# Patient Record
Sex: FEMALE | Race: WHITE | NOT HISPANIC OR LATINO | Employment: PART TIME | URBAN - METROPOLITAN AREA
[De-identification: names, ages, dates, MRNs, and addresses within clinical notes are randomized per-mention and may not be internally consistent; named-entity substitution may affect disease eponyms.]

---

## 2017-05-30 ENCOUNTER — OFFICE VISIT (OUTPATIENT)
Dept: NEUROLOGY | Facility: CLINIC | Age: 22
End: 2017-05-30
Payer: COMMERCIAL

## 2017-05-30 VITALS
HEART RATE: 72 BPM | BODY MASS INDEX: 35.43 KG/M2 | WEIGHT: 239.19 LBS | HEIGHT: 69 IN | DIASTOLIC BLOOD PRESSURE: 76 MMHG | SYSTOLIC BLOOD PRESSURE: 110 MMHG

## 2017-05-30 DIAGNOSIS — G47.33 OBSTRUCTIVE SLEEP APNEA: Primary | ICD-10-CM

## 2017-05-30 PROCEDURE — 99999 PR PBB SHADOW E&M-EST. PATIENT-LVL III: CPT | Mod: PBBFAC,,, | Performed by: NURSE PRACTITIONER

## 2017-05-30 PROCEDURE — 99213 OFFICE O/P EST LOW 20 MIN: CPT | Mod: S$GLB,,, | Performed by: NURSE PRACTITIONER

## 2017-05-30 NOTE — PROGRESS NOTES
Sho Castaneda a 22 y.o. female  returns today regarding the management of obstructive sleep apnea. Last seen 2015.     Since last seen, she has continued to use apap nightly. Faithfully. Continued resolution of snoring, disrupted sleep. Feels well rested in am. No pressure intolerance or leak issues. No chinstrap but denies oral drying. Still feels tired but attributes to thyroid disease. No recent labs (july '16 4.03). Using apap has helped nasal patency, less congested. 9# gain.Denies feeling sleepy during daytime or falling asleep inappropriately. Denies interval medical change.     Interrogation- 90% tile 8.7cm. avg use 8:12h/n. Nuance mask. AHI 1.7, 0% leak. 0% periodic. 30d>4h.       HISTORY:  1/15/15:  She has since been setup with apap 4-14cm. She has been using it nightly. No more snoring she and her boyfriend are happy about. She finds she is getting deeper sleep. Sleep is also less disrupted from 0-1x/night now vs previous 3-4x/night. She likes her nasal mask. Using chin strap. No nasal drying. No oral drying. No mask air leaks. Still having daytime tiredness, sleep bit unrefreshing. Finds the pressure has helped her sinus patency and asthma. Not needed to use rescue inhaler in past 2 -mos.  She takes Vyvanse long-acting am and short-acting at 4pm. Denies difficulty falling asleep. ESS=4    When she can nap, denies vivid dreams. Naps are not refreshing though. Denies sleep paralysis recurrent episodes, once age 14. Denies hallucinations, restless legs or leg kicking.     Interrogation: new machine condition. 30d avg 9:12h/night. M Nuance mask fits well today upon demo. AHI 0.9. 90%-tile 8-9cm. 0% air leak. 30/30d>4h  FH: dad +RODRIGUE/PAP    REVIEW OF SYSTEMS: Sleep related symptoms as per HPI. + Focus problems. + sinus congestion improved, no longer using NS. Otherwise a balance review of 10-systems is negative.     HST AHI 5/low sat 83%. (Nov 2014, 246#)         PHYSICAL EXAM:   /76   Pulse 72   " Ht 5' 9" (1.753 m)   Wt 108.5 kg (239 lb 3.2 oz)   BMI 35.32 kg/m²   GENERAL: Obese body habitus, well groomed     ASSESSMENT:   1. Obstructive sleep apnea, mild significant, continued excellent adherence, with improvement of symptoms  Medical comorbidities include: obesity    PLAN:   1. Continue APAP 7-10cm. Discussed effectiveness of her therapy and potential ramifications of untreated RODRIGUE, including heart disease, hypertension, cognitive difficulties, stroke, and diabetes.   [facility] DME prn replacement mask/supplies.   2. Advised to abstain from driving should she feel sleepy or drowsy.   3. See PCP re: TSH  4. RTC 1 yr, sooner if needed  "

## 2018-01-03 ENCOUNTER — PATIENT MESSAGE (OUTPATIENT)
Dept: ENDOCRINOLOGY | Facility: CLINIC | Age: 23
End: 2018-01-03

## 2018-01-03 ENCOUNTER — OFFICE VISIT (OUTPATIENT)
Dept: ENDOCRINOLOGY | Facility: CLINIC | Age: 23
End: 2018-01-03
Payer: COMMERCIAL

## 2018-01-03 VITALS
HEART RATE: 78 BPM | DIASTOLIC BLOOD PRESSURE: 70 MMHG | SYSTOLIC BLOOD PRESSURE: 116 MMHG | BODY MASS INDEX: 37.65 KG/M2 | WEIGHT: 254.19 LBS | HEIGHT: 69 IN

## 2018-01-03 DIAGNOSIS — E03.9 HYPOTHYROIDISM, UNSPECIFIED TYPE: Primary | ICD-10-CM

## 2018-01-03 DIAGNOSIS — F32.A DEPRESSION, UNSPECIFIED DEPRESSION TYPE: ICD-10-CM

## 2018-01-03 DIAGNOSIS — E06.3 HASHIMOTO'S DISEASE: ICD-10-CM

## 2018-01-03 PROCEDURE — 99999 PR PBB SHADOW E&M-EST. PATIENT-LVL III: CPT | Mod: PBBFAC,,, | Performed by: INTERNAL MEDICINE

## 2018-01-03 PROCEDURE — 99204 OFFICE O/P NEW MOD 45 MIN: CPT | Mod: S$GLB,,, | Performed by: INTERNAL MEDICINE

## 2018-01-03 RX ORDER — LIOTHYRONINE SODIUM 5 UG/1
5 TABLET ORAL DAILY
Qty: 30 TABLET | Refills: 5 | Status: SHIPPED | OUTPATIENT
Start: 2018-01-03 | End: 2023-04-20

## 2018-01-03 RX ORDER — LEVOTHYROXINE SODIUM 25 UG/1
25 TABLET ORAL DAILY
Qty: 30 TABLET | Refills: 5 | Status: SHIPPED | OUTPATIENT
Start: 2018-01-03 | End: 2018-08-14 | Stop reason: SDUPTHER

## 2018-01-03 NOTE — PROGRESS NOTES
Subjective:      Patient ID: Sho Castaneda is a 22 y.o. female.    Chief Complaint:  Hypothyroidism      History of Present Illness  Ms. Castaneda presents for evaluation and management of hypothyroidism.    Has active history of hypothyroidism secondary to Hashimoto's, RODRIGUE and ADD.    First diagnosed with hypothyroidism in 2011. Has been on levothyroxine 50 mcg since that time.    Ran out of levothyroxine 4 months ago.     Has overt fatigue but stable and ADHD medication improves fatigue. Still has significant mental fog. Has some cold intolerance. BM's normal.   Has depression that is reasonably controlled on Prozac.    Weight has been stable in the past 3-4 years.     Review of Systems   Constitutional: Negative for unexpected weight change.   HENT: Negative for voice change.    Eyes: Negative for visual disturbance.   Respiratory: Negative for shortness of breath.    Cardiovascular: Negative for chest pain.   Gastrointestinal: Negative for abdominal pain.   Endocrine: Negative for cold intolerance.   Genitourinary: Negative for frequency.   Musculoskeletal: Negative for myalgias.   Skin: Negative for rash.       Objective:   Physical Exam   Constitutional: She is oriented to person, place, and time. She appears well-developed and well-nourished.   HENT:   Head: Normocephalic and atraumatic.   Right Ear: External ear normal.   Left Ear: External ear normal.   Nose: Nose normal.   Neck: No tracheal deviation present. No thyromegaly present.   Cardiovascular: Normal rate, regular rhythm and normal heart sounds.    No edema   Pulmonary/Chest: Effort normal and breath sounds normal.   Abdominal: Soft. Bowel sounds are normal. There is no tenderness.   Musculoskeletal:   Normal gait, no cyanosis or clubbing   Neurological: She is alert and oriented to person, place, and time. She has normal reflexes.   Vibration sense intact   Skin: Skin is warm and dry. No rash noted.   No nodules, no ulcers   Psychiatric: She has  a normal mood and affect. Judgment normal.   Vitals reviewed.  Thin pearly striae on abdomen    Lab Review:   Results for YVONNE FLORES (MRN [id number]) as of 1/3/2018 07:42   Ref. Range 7/6/2016 09:20   TSH Latest Ref Range: 0.400 - 4.000 uIU/mL 4.038 (H)   T3, Free Latest Ref Range: 2.3 - 4.2 pg/mL 2.2 (L)   Free T4 Latest Ref Range: 0.71 - 1.51 ng/dL 0.85   Thyroglobulin Interpretation Unknown SEE BELOW   Thyroglobulin Antibody Screen Latest Ref Range: <4.0 IU/mL <1.8   Thyroglobulin, Tumor Marker Latest Units: ng/mL 10 (H)   Thyroperoxidase Antibodies Latest Ref Range: <6.0 IU/mL 105.5 (H)       Assessment:     1. Hypothyroidism, unspecified type    2. Hashimoto's disease    3. Depression, unspecified depression type      Plan:     --Patient with hypothyroidism secondary to Hashimoto's  --She is having some symptoms that could be thyroid related, but she reports that the symptoms are unchanged whether or not she is on or off of thyroid hormone  --Will get a set of TFT's now with her off of thyroid hormone for 4 months  --Discussed treatment options  --She had questions about possibly using [place] (discussed cons of [place], but that there is some evidence for therapy with T3 in patients with depression and hypothyroidism)  --Will consider low dose Cytomel with levothyroxine once labs are back  --Will then need to repeat TSH in 6 weeks after dose adjusted  --Reviewed most recent thyroid ultrasound and no distinct thyroid nodules are seen    Manuel Travis M.D. Staff Endocrinology    Manuel Travis M.D. Staff Endocrinology

## 2018-02-15 ENCOUNTER — LAB VISIT (OUTPATIENT)
Dept: LAB | Facility: HOSPITAL | Age: 23
End: 2018-02-15
Attending: INTERNAL MEDICINE
Payer: COMMERCIAL

## 2018-02-15 DIAGNOSIS — E03.9 HYPOTHYROIDISM, UNSPECIFIED TYPE: ICD-10-CM

## 2018-02-15 LAB — TSH SERPL DL<=0.005 MIU/L-ACNC: 1.7 UIU/ML

## 2018-02-15 PROCEDURE — 84443 ASSAY THYROID STIM HORMONE: CPT

## 2018-02-15 PROCEDURE — 36415 COLL VENOUS BLD VENIPUNCTURE: CPT

## 2018-02-18 ENCOUNTER — PATIENT MESSAGE (OUTPATIENT)
Dept: ENDOCRINOLOGY | Facility: CLINIC | Age: 23
End: 2018-02-18

## 2018-02-19 ENCOUNTER — PATIENT MESSAGE (OUTPATIENT)
Dept: ENDOCRINOLOGY | Facility: CLINIC | Age: 23
End: 2018-02-19

## 2018-06-24 ENCOUNTER — OFFICE VISIT (OUTPATIENT)
Dept: URGENT CARE | Facility: CLINIC | Age: 23
End: 2018-06-24
Payer: COMMERCIAL

## 2018-06-24 VITALS
DIASTOLIC BLOOD PRESSURE: 85 MMHG | HEART RATE: 115 BPM | WEIGHT: 254 LBS | BODY MASS INDEX: 37.62 KG/M2 | HEIGHT: 69 IN | OXYGEN SATURATION: 96 % | RESPIRATION RATE: 16 BRPM | TEMPERATURE: 99 F | SYSTOLIC BLOOD PRESSURE: 124 MMHG

## 2018-06-24 DIAGNOSIS — H60.93 OTITIS EXTERNA OF BOTH EARS, UNSPECIFIED CHRONICITY, UNSPECIFIED TYPE: ICD-10-CM

## 2018-06-24 DIAGNOSIS — J01.00 ACUTE NON-RECURRENT MAXILLARY SINUSITIS: Primary | ICD-10-CM

## 2018-06-24 PROCEDURE — 96372 THER/PROPH/DIAG INJ SC/IM: CPT | Mod: S$GLB,,, | Performed by: NURSE PRACTITIONER

## 2018-06-24 PROCEDURE — 99214 OFFICE O/P EST MOD 30 MIN: CPT | Mod: 25,S$GLB,, | Performed by: NURSE PRACTITIONER

## 2018-06-24 RX ORDER — AMOXICILLIN AND CLAVULANATE POTASSIUM 875; 125 MG/1; MG/1
1 TABLET, FILM COATED ORAL 2 TIMES DAILY
Qty: 20 TABLET | Refills: 0 | Status: SHIPPED | OUTPATIENT
Start: 2018-06-24 | End: 2018-07-04

## 2018-06-24 RX ORDER — DEXTROAMPHETAMINE SACCHARATE, AMPHETAMINE ASPARTATE, DEXTROAMPHETAMINE SULFATE AND AMPHETAMINE SULFATE 7.5; 7.5; 7.5; 7.5 MG/1; MG/1; MG/1; MG/1
TABLET ORAL
Refills: 0 | COMMUNITY
Start: 2018-05-27

## 2018-06-24 RX ORDER — BETAMETHASONE SODIUM PHOSPHATE AND BETAMETHASONE ACETATE 3; 3 MG/ML; MG/ML
9 INJECTION, SUSPENSION INTRA-ARTICULAR; INTRALESIONAL; INTRAMUSCULAR; SOFT TISSUE
Status: COMPLETED | OUTPATIENT
Start: 2018-06-24 | End: 2018-06-24

## 2018-06-24 RX ORDER — NEOMYCIN SULFATE, POLYMYXIN B SULFATE AND HYDROCORTISONE 10; 3.5; 1 MG/ML; MG/ML; [USP'U]/ML
3 SUSPENSION/ DROPS AURICULAR (OTIC) 4 TIMES DAILY
Qty: 10 ML | Refills: 0 | Status: SHIPPED | OUTPATIENT
Start: 2018-06-24 | End: 2018-07-01

## 2018-06-24 RX ADMIN — BETAMETHASONE SODIUM PHOSPHATE AND BETAMETHASONE ACETATE 9 MG: 3; 3 INJECTION, SUSPENSION INTRA-ARTICULAR; INTRALESIONAL; INTRAMUSCULAR; SOFT TISSUE at 01:06

## 2018-06-24 NOTE — PROGRESS NOTES
"Subjective:       Patient ID: Sho Castaneda is a 23 y.o. female.    Vitals:    06/24/18 1259   BP: 124/85   Pulse: (!) 115   Resp: 16   Temp: 99 °F (37.2 °C)   TempSrc: Oral   SpO2: 96%   Weight: 115.2 kg (254 lb)   Height: 5' 9" (1.753 m)       Chief Complaint: Sore Throat; Sinus Problem; and Cough    Patient reports 4 days of nasal congestion,productive cough,ear pain/pressure with sore throat.  Reports chills and sweating at home.   Denies hx of frequent sinus infections or allergies.         Sore Throat    This is a new problem. Episode onset: 4 days. The problem has been gradually worsening. Neither side of throat is experiencing more pain than the other. There has been no fever. The pain is at a severity of 6/10. Associated symptoms include congestion (nasal), coughing, ear pain (both ears), headaches (sinus), swollen glands and trouble swallowing. Pertinent negatives include no abdominal pain, hoarse voice or shortness of breath. Associated symptoms comments: Ear pressure. She has had no exposure to strep or mono. Treatments tried: Day Quil, Flonase. The treatment provided mild relief.   Sinus Problem   This is a new problem. Episode onset: 4 days. The problem has been gradually worsening since onset. There has been no fever. The fever has been present for less than 1 day. Her pain is at a severity of 8/10. The pain is moderate. Associated symptoms include chills, congestion (nasal), coughing, diaphoresis, ear pain (both ears), headaches (sinus), sinus pressure, sneezing, a sore throat and swollen glands. Pertinent negatives include no hoarse voice or shortness of breath. Treatments tried: Day Quil,Flonase. The treatment provided mild relief.   Cough   This is a new problem. Episode onset: 4 days. The problem has been gradually worsening. The problem occurs hourly. The cough is productive of sputum. Associated symptoms include chills, ear pain (both ears), headaches (sinus), nasal congestion, postnasal " drip, rhinorrhea (morning) and a sore throat. Pertinent negatives include no chest pain, eye redness, fever, myalgias, shortness of breath or wheezing. Associated symptoms comments: Ear pressure. Nothing aggravates the symptoms. Treatments tried: Day Quil, Flonase. The treatment provided mild relief. Her past medical history is significant for bronchitis. There is no history of asthma.     Review of Systems   Constitution: Positive for chills, diaphoresis and malaise/fatigue. Negative for fever.   HENT: Positive for congestion (nasal), ear pain (both ears), postnasal drip, rhinorrhea (morning), sinus pressure, sneezing, sore throat and trouble swallowing. Negative for hoarse voice.         Jaw pain   Eyes: Negative for discharge and redness.   Cardiovascular: Negative for chest pain, dyspnea on exertion and leg swelling.   Respiratory: Positive for cough and sputum production (gray/green). Negative for shortness of breath and wheezing.    Musculoskeletal: Negative for myalgias.   Gastrointestinal: Negative for abdominal pain and nausea.   Neurological: Positive for headaches (sinus).       Objective:      Physical Exam   Constitutional: She is oriented to person, place, and time. She appears well-developed and well-nourished. She is cooperative.  Non-toxic appearance. She appears ill. No distress.   Moist to touch    HENT:   Head: Normocephalic and atraumatic.   Right Ear: Hearing and tympanic membrane normal. There is swelling (ear canal ). No drainage. Tympanic membrane is not perforated, not erythematous and not retracted.   Left Ear: Hearing and tympanic membrane normal. There is swelling (ear canal ). No drainage. Tympanic membrane is not perforated, not erythematous and not retracted.   Nose: Mucosal edema, rhinorrhea (mucupurulent ) and sinus tenderness (marked tenderness ) present. No nasal deformity. No epistaxis. Right sinus exhibits maxillary sinus tenderness. Right sinus exhibits no frontal sinus  "tenderness. Left sinus exhibits maxillary sinus tenderness. Left sinus exhibits no frontal sinus tenderness.   Mouth/Throat: Uvula is midline and mucous membranes are normal. No trismus in the jaw. Normal dentition. No uvula swelling. Posterior oropharyngeal edema and posterior oropharyngeal erythema present. No oropharyngeal exudate or tonsillar abscesses. Tonsils are 0 on the right. Tonsils are 0 on the left.   Reports hx of "eczema to her ears"   Bilateral auricles are dry, reddened, and flaky.    Inside the bilateral canals are erythema and swelling. Able to visualize TMs. Bilaterally. No need for wick at this time.      Eyes: Conjunctivae, EOM and lids are normal. Pupils are equal, round, and reactive to light. No scleral icterus.   Sclera clear bilat   Neck: Trachea normal, normal range of motion, full passive range of motion without pain and phonation normal. Neck supple.   Cardiovascular: Regular rhythm, normal heart sounds, intact distal pulses and normal pulses.  Tachycardia present.    Pulmonary/Chest: Effort normal and breath sounds normal. No stridor. No respiratory distress.   Abdominal: Soft. Normal appearance and bowel sounds are normal. She exhibits no distension. There is no tenderness.   Musculoskeletal: Normal range of motion. She exhibits no edema or deformity.   Lymphadenopathy:        Head (right side): Submandibular and tonsillar adenopathy present.        Head (left side): Submandibular and tonsillar adenopathy present.     She has no cervical adenopathy.   Neurological: She is alert and oriented to person, place, and time. She exhibits normal muscle tone. Coordination normal.   Skin: Skin is warm, dry and intact. She is not diaphoretic. No pallor.   Psychiatric: She has a normal mood and affect. Her speech is normal and behavior is normal. Judgment and thought content normal. Cognition and memory are normal.   Nursing note and vitals reviewed.      Assessment:       1. Acute non-recurrent " maxillary sinusitis    2. Otitis externa of both ears, unspecified chronicity, unspecified type        Plan:       Sho was seen today for sore throat, sinus problem and cough.    Diagnoses and all orders for this visit:    Acute non-recurrent maxillary sinusitis  -     amoxicillin-clavulanate 875-125mg (AUGMENTIN) 875-125 mg per tablet; Take 1 tablet by mouth 2 (two) times daily. for 10 days  -     betamethasone acetate-betamethasone sodium phosphate injection 9 mg; Inject 1.5 mLs (9 mg total) into the muscle one time.    Otitis externa of both ears, unspecified chronicity, unspecified type  -     neomycin-polymyxin-hydrocortisone (CORTISPORIN) 3.5-10,000-1 mg/mL-unit/mL-% otic suspension; Place 3 drops into both ears 4 (four) times daily. for 7 days        will start PO antibiotic treatment based on physical exam  Discussed OTC agents to help with symptom relief.     Patient Instructions     Please drink plenty of fluids.  Please get plenty of rest.  Please return here or go to the Emergency Department for any concerns or worsening of condition.  If you were given wait & see antibiotics, please wait 3-5 days before taking them, and only take them if your symptoms have worsened or not improved.  If you do begin taking the antibiotics, please take them to completion.  If you were prescribed antibiotics, please take them to completion.  If you were prescribed a narcotic medication, do not drive or operate heavy equipment or machinery while taking these medications.  If you do not have Hypertension or any history of palpitations, it is ok to take over the counter Sudafed or Mucinex D or Allegra-D or Claritin-D or Zyrtec-D.  If you do take one of the above, it is ok to combine that with plain over the counter Mucinex or Allegra or Claritin or Zyrtec.  If for example you are taking Zyrtec -D, you can combine that with Mucinex, but not Mucinex-D.  If you are taking Mucinex-D, you can combine that with plain Allegra or  Claritin or Zyrtec.   If you do have Hypertension or palpitations, it is safe to take Coricidin HBP for relief of sinus symptoms.  We recommend you take over the counter Flonase (Fluticasone) or another nasally inhaled steroid unless you are already taking one.  Nasal irrigation with a saline spray or Netti Pot like device per their directions is also recommended.  If not allergic, please take over the counter Tylenol (Acetaminophen) and/or Motrin (Ibuprofen) as directed for control of pain and/or fever.  Please follow up with your primary care doctor or specialist as needed.    If you  smoke, please stop smoking.      Sinusitis (Antibiotic Treatment)    The sinuses are air-filled spaces within the bones of the face. They connect to the inside of the nose. Sinusitis is an inflammation of the tissue lining the sinus cavity. Sinus inflammation can occur during a cold. It can also be due to allergies to pollens and other particles in the air. Sinusitis can cause symptoms of sinus congestion and fullness. A sinus infection causes fever, headache and facial pain. There is often green or yellow drainage from the nose or into the back of the throat (post-nasal drip). You have been given antibiotics to treat this condition.  Home care:  · Take the full course of antibiotics as instructed. Do not stop taking them, even if you feel better.  · Drink plenty of water, hot tea, and other liquids. This may help thin mucus. It also may promote sinus drainage.  · Heat may help soothe painful areas of the face. Use a towel soaked in hot water. Or,  the shower and direct the hot spray onto your face. Using a vaporizer along with a menthol rub at night may also help.   · An expectorant containing guaifenesin may help thin the mucus and promote drainage from the sinuses.  · Over-the-counter decongestants may be used unless a similar medicine was prescribed. Nasal sprays work the fastest. Use one that contains phenylephrine or  oxymetazoline. First blow the nose gently. Then use the spray. Do not use these medicines more often than directed on the label or symptoms may get worse. You may also use tablets containing pseudoephedrine. Avoid products that combine ingredients, because side effects may be increased. Read labels. You can also ask the pharmacist for help. (NOTE: Persons with high blood pressure should not use decongestants. They can raise blood pressure.)  · Over-the-counter antihistamines may help if allergies contributed to your sinusitis.    · Do not use nasal rinses or irrigation during an acute sinus infection, unless told to by your health care provider. Rinsing may spread the infection to other sinuses.  · Use acetaminophen or ibuprofen to control pain, unless another pain medicine was prescribed. (If you have chronic liver or kidney disease or ever had a stomach ulcer, talk with your doctor before using these medicines. Aspirin should never be used in anyone under 18 years of age who is ill with a fever. It may cause severe liver damage.)  · Don't smoke. This can worsen symptoms.  Follow-up care  Follow up with your healthcare provider or our staff if you are not improving within the next week.  When to seek medical advice  Call your healthcare provider if any of these occur:  · Facial pain or headache becoming more severe  · Stiff neck  · Unusual drowsiness or confusion  · Swelling of the forehead or eyelids  · Vision problems, including blurred or double vision  · Fever of 100.4ºF (38ºC) or higher, or as directed by your healthcare provider  · Seizure  · Breathing problems  · Symptoms not resolving within 10 days  Date Last Reviewed: 4/13/2015  © 3536-6682 [employer]. [address] [place] [address], [place] [address]. All rights reserved. This information is not intended as a substitute for professional medical care. Always follow your healthcare professional's instructions.

## 2018-06-24 NOTE — PATIENT INSTRUCTIONS
Please drink plenty of fluids.  Please get plenty of rest.  Please return here or go to the Emergency Department for any concerns or worsening of condition.  If you were given wait & see antibiotics, please wait 3-5 days before taking them, and only take them if your symptoms have worsened or not improved.  If you do begin taking the antibiotics, please take them to completion.  If you were prescribed antibiotics, please take them to completion.  If you were prescribed a narcotic medication, do not drive or operate heavy equipment or machinery while taking these medications.  If you do not have Hypertension or any history of palpitations, it is ok to take over the counter Sudafed or Mucinex D or Allegra-D or Claritin-D or Zyrtec-D.  If you do take one of the above, it is ok to combine that with plain over the counter Mucinex or Allegra or Claritin or Zyrtec.  If for example you are taking Zyrtec -D, you can combine that with Mucinex, but not Mucinex-D.  If you are taking Mucinex-D, you can combine that with plain Allegra or Claritin or Zyrtec.   If you do have Hypertension or palpitations, it is safe to take Coricidin HBP for relief of sinus symptoms.  We recommend you take over the counter Flonase (Fluticasone) or another nasally inhaled steroid unless you are already taking one.  Nasal irrigation with a saline spray or Netti Pot like device per their directions is also recommended.  If not allergic, please take over the counter Tylenol (Acetaminophen) and/or Motrin (Ibuprofen) as directed for control of pain and/or fever.  Please follow up with your primary care doctor or specialist as needed.    If you  smoke, please stop smoking.      Sinusitis (Antibiotic Treatment)    The sinuses are air-filled spaces within the bones of the face. They connect to the inside of the nose. Sinusitis is an inflammation of the tissue lining the sinus cavity. Sinus inflammation can occur during a cold. It can also be due to  allergies to pollens and other particles in the air. Sinusitis can cause symptoms of sinus congestion and fullness. A sinus infection causes fever, headache and facial pain. There is often green or yellow drainage from the nose or into the back of the throat (post-nasal drip). You have been given antibiotics to treat this condition.  Home care:  · Take the full course of antibiotics as instructed. Do not stop taking them, even if you feel better.  · Drink plenty of water, hot tea, and other liquids. This may help thin mucus. It also may promote sinus drainage.  · Heat may help soothe painful areas of the face. Use a towel soaked in hot water. Or,  the shower and direct the hot spray onto your face. Using a vaporizer along with a menthol rub at night may also help.   · An expectorant containing guaifenesin may help thin the mucus and promote drainage from the sinuses.  · Over-the-counter decongestants may be used unless a similar medicine was prescribed. Nasal sprays work the fastest. Use one that contains phenylephrine or oxymetazoline. First blow the nose gently. Then use the spray. Do not use these medicines more often than directed on the label or symptoms may get worse. You may also use tablets containing pseudoephedrine. Avoid products that combine ingredients, because side effects may be increased. Read labels. You can also ask the pharmacist for help. (NOTE: Persons with high blood pressure should not use decongestants. They can raise blood pressure.)  · Over-the-counter antihistamines may help if allergies contributed to your sinusitis.    · Do not use nasal rinses or irrigation during an acute sinus infection, unless told to by your health care provider. Rinsing may spread the infection to other sinuses.  · Use acetaminophen or ibuprofen to control pain, unless another pain medicine was prescribed. (If you have chronic liver or kidney disease or ever had a stomach ulcer, talk with your doctor before  using these medicines. Aspirin should never be used in anyone under 18 years of age who is ill with a fever. It may cause severe liver damage.)  · Don't smoke. This can worsen symptoms.  Follow-up care  Follow up with your healthcare provider or our staff if you are not improving within the next week.  When to seek medical advice  Call your healthcare provider if any of these occur:  · Facial pain or headache becoming more severe  · Stiff neck  · Unusual drowsiness or confusion  · Swelling of the forehead or eyelids  · Vision problems, including blurred or double vision  · Fever of 100.4ºF (38ºC) or higher, or as directed by your healthcare provider  · Seizure  · Breathing problems  · Symptoms not resolving within 10 days  Date Last Reviewed: 4/13/2015  © 1134-2716 [employer]. [address] [place] [address], [place] [address]. All rights reserved. This information is not intended as a substitute for professional medical care. Always follow your healthcare professional's instructions.

## 2018-06-24 NOTE — LETTER
June 24, 2018      [facility] [address] [facility] [address]  Phone: [phone]  Fax: [phone]       Patient: Sho Castaneda   YOB: 1995  Date of Visit: 06/24/2018    To Whom It May Concern:    Alton Castaneda  was at [facility] on 06/24/2018. She may return to work on 6/26/18 with no restrictions. If you have any questions or concerns, or if I can be of further assistance, please do not hesitate to contact me.    Sincerely,        Lucille Barriga, NP

## 2018-08-14 DIAGNOSIS — E03.9 PRIMARY HYPOTHYROIDISM: Primary | ICD-10-CM

## 2018-08-14 RX ORDER — LEVOTHYROXINE SODIUM 25 UG/1
25 TABLET ORAL DAILY
Qty: 30 TABLET | Refills: 5 | Status: SHIPPED | OUTPATIENT
Start: 2018-08-14 | End: 2023-04-20

## 2018-08-27 ENCOUNTER — OFFICE VISIT (OUTPATIENT)
Dept: URGENT CARE | Facility: CLINIC | Age: 23
End: 2018-08-27
Payer: COMMERCIAL

## 2018-08-27 VITALS
WEIGHT: 240 LBS | SYSTOLIC BLOOD PRESSURE: 150 MMHG | HEART RATE: 104 BPM | DIASTOLIC BLOOD PRESSURE: 95 MMHG | TEMPERATURE: 98 F | RESPIRATION RATE: 18 BRPM | OXYGEN SATURATION: 98 % | HEIGHT: 69 IN | BODY MASS INDEX: 35.55 KG/M2

## 2018-08-27 DIAGNOSIS — H60.543 ACUTE ECZEMATOID OTITIS EXTERNA OF BOTH EARS: ICD-10-CM

## 2018-08-27 DIAGNOSIS — J02.9 PHARYNGITIS, UNSPECIFIED ETIOLOGY: Primary | ICD-10-CM

## 2018-08-27 LAB
CTP QC/QA: YES
S PYO RRNA THROAT QL PROBE: NEGATIVE

## 2018-08-27 PROCEDURE — 99214 OFFICE O/P EST MOD 30 MIN: CPT | Mod: 25,S$GLB,, | Performed by: EMERGENCY MEDICINE

## 2018-08-27 PROCEDURE — 96372 THER/PROPH/DIAG INJ SC/IM: CPT | Mod: S$GLB,,, | Performed by: EMERGENCY MEDICINE

## 2018-08-27 PROCEDURE — 87880 STREP A ASSAY W/OPTIC: CPT | Mod: QW,S$GLB,, | Performed by: EMERGENCY MEDICINE

## 2018-08-27 RX ORDER — CEFDINIR 300 MG/1
300 CAPSULE ORAL EVERY 12 HOURS
Qty: 14 CAPSULE | Refills: 0 | Status: SHIPPED | OUTPATIENT
Start: 2018-08-27 | End: 2018-09-03

## 2018-08-27 RX ORDER — NEOMYCIN SULFATE, POLYMYXIN B SULFATE, HYDROCORTISONE 3.5; 10000; 1 MG/ML; [USP'U]/ML; MG/ML
4 SOLUTION/ DROPS AURICULAR (OTIC) EVERY 6 HOURS
Qty: 10 ML | Refills: 1 | Status: SHIPPED | OUTPATIENT
Start: 2018-08-27 | End: 2018-09-06

## 2018-08-27 RX ORDER — BETAMETHASONE SODIUM PHOSPHATE AND BETAMETHASONE ACETATE 3; 3 MG/ML; MG/ML
9 INJECTION, SUSPENSION INTRA-ARTICULAR; INTRALESIONAL; INTRAMUSCULAR; SOFT TISSUE
Status: COMPLETED | OUTPATIENT
Start: 2018-08-27 | End: 2018-08-27

## 2018-08-27 RX ORDER — DEXTROAMPHETAMINE SACCHARATE, AMPHETAMINE ASPARTATE, DEXTROAMPHETAMINE SULFATE AND AMPHETAMINE SULFATE 5; 5; 5; 5 MG/1; MG/1; MG/1; MG/1
1 TABLET ORAL DAILY
COMMUNITY
End: 2023-04-13

## 2018-08-27 RX ADMIN — BETAMETHASONE SODIUM PHOSPHATE AND BETAMETHASONE ACETATE 9 MG: 3; 3 INJECTION, SUSPENSION INTRA-ARTICULAR; INTRALESIONAL; INTRAMUSCULAR; SOFT TISSUE at 03:08

## 2018-08-27 NOTE — PROGRESS NOTES
"Subjective:       Patient ID: Sho Castaneda is a 23 y.o. female.    Vitals:    08/27/18 1450   BP: (!) 150/95   Pulse: 104   Resp: 18   Temp: 98.2 °F (36.8 °C)   TempSrc: Oral   SpO2: 98%   Weight: 108.9 kg (240 lb)   Height: 5' 9" (1.753 m)       Chief Complaint: Sore Throat    Pt states Wednesday onset with sore throat that has continued into today. SYMPTOMS HAVE BEEN GOING ON FOR 5-6 DAYS. POSSIBLY HAS BEEN MASKING FEVERS WITH MOTRIN 600 MG EVERY 12 HOURS FOR 4 DAYS STRAIGHT.      Sore Throat    This is a new problem. The current episode started in the past 7 days. The problem has been gradually worsening. Neither side of throat is experiencing more pain than the other. There has been no fever. The pain is at a severity of 4/10. The pain is mild. Associated symptoms include headaches. Pertinent negatives include no abdominal pain, diarrhea, shortness of breath or vomiting. Treatments tried: advil. The treatment provided moderate relief.     Review of Systems   Constitution: Negative for chills and fever.   HENT: Positive for odynophagia and sore throat.    Eyes: Negative for blurred vision.   Cardiovascular: Negative for chest pain.   Respiratory: Negative for shortness of breath.    Skin: Negative for rash.   Musculoskeletal: Negative for back pain and joint pain.   Gastrointestinal: Negative for abdominal pain, diarrhea, nausea and vomiting.   Neurological: Positive for headaches.   Psychiatric/Behavioral: The patient is not nervous/anxious.        Objective:      Physical Exam   Constitutional: She is oriented to person, place, and time. She appears well-developed and well-nourished. She is cooperative.  Non-toxic appearance. She does not appear ill. No distress.   HENT:   Head: Normocephalic and atraumatic.   Right Ear: Hearing, tympanic membrane and ear canal normal.   Left Ear: Hearing, tympanic membrane and ear canal normal.   Nose: Nose normal. No mucosal edema, rhinorrhea or nasal deformity. No " epistaxis. Right sinus exhibits no maxillary sinus tenderness and no frontal sinus tenderness. Left sinus exhibits no maxillary sinus tenderness and no frontal sinus tenderness.   Mouth/Throat: Uvula is midline and mucous membranes are normal. No trismus in the jaw. Normal dentition. No uvula swelling. No posterior oropharyngeal erythema.   ATOPIC DERMATITIS, EDEMATOUS, FLAKING, EXCORIATED AND ERYTHEMATOUS RASDH OF THE EXTERNAL CANAL OF BOTH EARS, OBSCURING THE TM BILATERALLY    POSTERIOR OP MARKEDLY ERYTHEMATOUS, NO EXUDATE  RIGHT ANTERIOR CERVICAL LYMPHADENITIS   Eyes: Conjunctivae and lids are normal. No scleral icterus.   Sclera clear bilat   Neck: Trachea normal, full passive range of motion without pain and phonation normal. Neck supple.   Cardiovascular: Normal rate, regular rhythm, normal heart sounds, intact distal pulses and normal pulses.   Pulmonary/Chest: Effort normal and breath sounds normal. No respiratory distress.   Abdominal: Soft. Normal appearance and bowel sounds are normal. There is no tenderness.   Musculoskeletal: Normal range of motion. She exhibits no edema or deformity.   Neurological: She is alert and oriented to person, place, and time. She exhibits normal muscle tone. Coordination normal.   Skin: Skin is warm, dry and intact. She is not diaphoretic. No pallor.   Psychiatric: She has a normal mood and affect. Her speech is normal and behavior is normal. Cognition and memory are normal.   Nursing note and vitals reviewed.      Office Visit on 08/27/2018   Component Date Value Ref Range Status    Rapid Strep A Screen 08/27/2018 Negative  Negative Final     Acceptable 08/27/2018 Yes   Final       Assessment:       1. Pharyngitis, unspecified etiology    2. Acute eczematoid otitis externa of both ears        Plan:       Sho was seen today for sore throat.    Diagnoses and all orders for this visit:    Pharyngitis, unspecified etiology  -     POCT rapid strep A    Acute  eczematoid otitis externa of both ears    Other orders  -     betamethasone acetate-betamethasone sodium phosphate injection 9 mg; Inject 1.5 mLs (9 mg total) into the muscle one time.  -     cefdinir (OMNICEF) 300 MG capsule; Take 1 capsule (300 mg total) by mouth every 12 (twelve) hours. for 7 days  -     neomycin-polymyxin-hydrocortisone (CORTISPORIN) otic solution; Place 4 drops into both ears every 6 (six) hours. for 10 days

## 2019-09-08 DIAGNOSIS — E03.9 PRIMARY HYPOTHYROIDISM: ICD-10-CM

## 2019-09-09 RX ORDER — LEVOTHYROXINE SODIUM 25 UG/1
TABLET ORAL
Qty: 30 TABLET | Refills: 0 | OUTPATIENT
Start: 2019-09-09

## 2020-01-15 DIAGNOSIS — G47.33 OBSTRUCTIVE SLEEP APNEA: Primary | ICD-10-CM

## 2020-03-14 NOTE — PATIENT INSTRUCTIONS
RAPID STREP TEST NEGATIVE  CEFDINIR RX  FILL AND TAKE IF NOT MUCH IMPROVED IN 2-3 DAYS  CELESTONE 1.5 CC GIVEN IN CLINIC  MOTRIN 600 MG EVERY 6-8 HOURS FOR FEVER/SORE THROAT  CORTISPORIN OTIC DROPS RX    SEE OTITIS EXTERNA SHEET  SEE SORE THROAT SHEET  RETURN FOR ANY CONCERNS OR PROBLEMS  FOLLOW UP WITH YOUR PCP  External Ear Infection (Adult)    External otitis (also called swimmers ear) is an infection in the ear canal. It is often caused by bacteria or fungus. It can occur a few days after water gets trapped in the ear canal (from swimming or bathing). It can also occur after cleaning too deeply in the ear canal with a cotton swab or other object. Sometimes, hair care products get into the ear canal and cause this problem.  Symptoms can include pain, fever, itching, redness, drainage, or swelling of the ear canal. Temporary hearing loss may also occur.  Home care  · Do not try to clean the ear canal. This can push pus and bacteria deeper into the canal.  · Use prescribed ear drops as directed. These help reduce swelling and fight the infection. If an ear wick was placed in the ear canal, apply drops right onto the end of the wick. The wick will draw the medication into the ear canal even if it is swollen closed.  · A cotton ball may be loosely placed in the outer ear to absorb any drainage.  · You may use acetaminophen or ibuprofen to control pain, unless another medication was prescribed. Note: If you have chronic liver or kidney disease or ever had a stomach ulcer or GI bleeding, talk to your health care provider before taking any of these medications.  · Do not allow water to get into your ear when bathing. Also, avoid swimming until the infection has cleared.  Prevention  · Keep your ears dry. This helps lower the risk of infection. Dry your ears with a towel or hair dryer after getting wet. Also, use ear plugs when swimming.  · Do not stick any objects in the ear to remove wax.  · If you feel water  trapped in your ear, use ear drops right away. You can get these drops over the counter at most drugstores. They work by removing water from the ear canal.  Follow-up care  Follow up with your health care provider in one week, or as advised.  When to seek medical advice  Call your health care provider right away if any of these occur:  · Ear pain becomes worse or doesnt improve after 3 days of treatment  · Redness or swelling of the outer ear occurs or gets worse  · Headache  · Painful or stiff neck  · Drowsiness or confusion  · Fever of 100.4ºF (38ºC) or higher, or as directed by your health care provider  · Seizure  Date Last Reviewed: 3/22/2015  © 9739-0007 [employer]. [address] [place] [address], [place] [address]. All rights reserved. This information is not intended as a substitute for professional medical care. Always follow your healthcare professional's instructions.        When You Have a Sore Throat    A sore throat can be painful. There are many reasons why you may have a sore throat. Your healthcare provider will work with you to find the cause of your sore throat. He or she will also find the best treatment for you.  What causes a sore throat?  Sore throats can be caused or worsened by:  · Cold or flu viruses  · Bacteria  · Irritants such as tobacco smoke or air pollution  · Acid reflux  A healthy throat  The tonsils are on the sides of the throat near the base of the tongue. They collect viruses and bacteria and help fight infection. The throat (pharynx) is the passage for air. Mucus from the nasal cavity also moves down the passage.  An inflamed throat  The tonsils and pharynx can become inflamed due to a cold or flu virus. Postnasal drip (excess mucus draining from the nasal cavity) can irritate the throat. It can also make the throat or tonsils more likely to be infected by bacteria. Severe, untreated tonsillitis in children or adults can cause a pocket of pus (abscess) to form near the  complex febrile seizure tonsil.  Your evaluation  A medical evaluation can help find the cause of your sore throat. It can also help your healthcare provider choose the best treatment for you. The evaluation may include a health history, physical exam, and diagnostic tests.  Health history  Your healthcare provider may ask you the following:  · How long has the sore throat lasted and how have you been treating it?  · Do you have any other symptoms, such as body aches, fever, or cough?  · Does your sore throat recur? If so, how often? How many days of school or work have you missed because of a sore throat?  · Do you have trouble eating or swallowing?  · Have you been told that you snore or have other sleep problems?  · Do you have bad breath?  · Do you cough up bad-tasting mucus?  Physical exam  During the exam, your healthcare provider checks your ears, nose, and throat for problems. He or she also checks for swelling in the neck, and may listen to your chest.  Possible tests  Other tests your healthcare provider may perform include:  · A throat swab to check for bacteria such as streptococcus (the bacteria that causes strep throat)  · A blood test to check for mononucleosis (a viral infection)  · A chest X-ray to rule out pneumonia, especially if you have a cough  Treating a sore throat  Treatment depends on many factors. What is the likely cause? Is the problem recent? Does it keep coming back? In many cases, the best thing to do is to treat the symptoms, rest, and let the problem heal itself. Antibiotics may help clear up some bacterial infections. For cases of severe or recurring tonsillitis, the tonsils may need to be removed.  Relieving your symptoms  · Dont smoke, and avoid secondhand smoke.  · For children, try throat sprays or Popsicles. Adults and older children may try lozenges.  · Drink warm liquids to soothe the throat and help thin mucus. Avoid alcohol, spicy foods, and acidic drinks such as orange juice. These can irritate  "the throat.  · Gargle with warm saltwater (1 teaspoon of salt to 8 ounces of warm water).  · Use a humidifier to keep air moist and relieve throat dryness.  · Try over-the-counter pain relievers such as acetaminophen or ibuprofen. Use as directed, and dont exceed the recommended dose. Dont give aspirin to children.   Are antibiotics needed?  If your sore throat is due to a bacterial infection, antibiotics may speed healing and prevent complications. Although group A streptococcus ("strep throat" or GAS) is the major treatable infection for a sore throat, GAS causes only 5% to 15% of sore throats in adults who seek medical care. Most sore throats are caused by cold or flu viruses. And antibiotics dont treat viral illness. In fact, using antibiotics when theyre not needed may produce bacteria that are harder to kill. Your healthcare provider will prescribe antibiotics only if he or she thinks they are likely to help.  If antibiotics are prescribed  Take the medicine exactly as directed. Be sure to finish your prescription even if youre feeling better. And be sure to ask your healthcare provider or pharmacist what side effects are common and what to do about them.  Is surgery needed?  In some cases, tonsils need to be removed. This is often done as outpatient (same-day) surgery. Your healthcare provider may advise removing the tonsils in cases of:  · Several severe bouts of tonsillitis in a year. Severe episodes include those that lead to missed days of school or work, or that need to be treated with antibiotics.  · Tonsillitis that causes breathing problems during sleep  · Tonsillitis caused by food particles collecting in pouches in the tonsils (cryptic tonsillitis)  Call your healthcare provider if any of the following occur:  · Symptoms worsen, or new symptoms develop.  · Swollen tonsils make breathing difficult.  · The pain is severe enough to keep you from drinking liquids.  · A skin rash, hives, or " wheezing develops. Any of these could signal an allergic reaction to antibiotics.  · Symptoms dont improve within a week.  · Symptoms dont improve within 2 to 3 days of starting antibiotics.   Date Last Reviewed: 10/1/2016  © 1846-4621 [employer]. [address] [place] [address], [place] [address]. All rights reserved. This information is not intended as a substitute for professional medical care. Always follow your healthcare professional's instructions.

## 2021-04-16 ENCOUNTER — PATIENT MESSAGE (OUTPATIENT)
Dept: RESEARCH | Facility: HOSPITAL | Age: 26
End: 2021-04-16

## 2021-12-09 ENCOUNTER — TELEPHONE (OUTPATIENT)
Dept: INTERNAL MEDICINE | Facility: CLINIC | Age: 26
End: 2021-12-09
Payer: COMMERCIAL

## 2022-07-15 ENCOUNTER — TELEPHONE (OUTPATIENT)
Dept: SLEEP MEDICINE | Facility: CLINIC | Age: 27
End: 2022-07-15
Payer: COMMERCIAL

## 2022-07-15 NOTE — TELEPHONE ENCOUNTER
Left message that she'd need f/u appt to order new machine and we can discuss her sleep status. enc'd to make f/u appt either virtual or in person [place] or [facility]

## 2022-07-15 NOTE — TELEPHONE ENCOUNTER
----- Message from Anabelle Felton MA sent at 7/15/2022 11:16 AM CDT -----  Regarding: FW: c pap    ----- Message -----  From: Milton Larson  Sent: 7/15/2022   9:24 AM CDT  To: Sammy NGUYEN Staff  Subject: c pap                                            Type:  Patient Returning Call    Who Called:[facility]     Who Left Message for Patient:n/a    Does the patient know what this is regarding?:new order for cpap    Would the patient rather a call back or a response via [facility]? Fax    Best Call Back Number:fax: [phone]    Additional Information: n/a

## 2022-07-19 ENCOUNTER — TELEPHONE (OUTPATIENT)
Dept: SLEEP MEDICINE | Facility: CLINIC | Age: 27
End: 2022-07-19
Payer: COMMERCIAL

## 2022-07-19 NOTE — TELEPHONE ENCOUNTER
----- Message from Alfonso López sent at 7/19/2022 12:14 PM CDT -----  Type:  Patient Returning Call    Who Called:Kasey w/ [facility]   Would the patient rather a call back or a response via [facility]? Call back   Best Call Back Number:  Fax # [phone]  Additional Information:     Requesting orders for cpap machine    
YOHANNES with [facility]. Yanet left a message with the patient stating she needed to come in for a follow up appointment in order to get a script for supplies. The patient has not called back yet  
Quality 110: Preventive Care And Screening: Influenza Immunization: Influenza Immunization Administered during Influenza season
Detail Level: Detailed

## 2023-04-13 ENCOUNTER — OFFICE VISIT (OUTPATIENT)
Dept: INTERNAL MEDICINE | Facility: CLINIC | Age: 28
End: 2023-04-13
Payer: MEDICAID

## 2023-04-13 ENCOUNTER — LAB VISIT (OUTPATIENT)
Dept: LAB | Facility: HOSPITAL | Age: 28
End: 2023-04-13
Payer: MEDICAID

## 2023-04-13 VITALS
HEART RATE: 120 BPM | RESPIRATION RATE: 18 BRPM | TEMPERATURE: 98 F | OXYGEN SATURATION: 96 % | HEIGHT: 69 IN | SYSTOLIC BLOOD PRESSURE: 124 MMHG | BODY MASS INDEX: 43.4 KG/M2 | DIASTOLIC BLOOD PRESSURE: 96 MMHG | WEIGHT: 293 LBS

## 2023-04-13 DIAGNOSIS — L68.0 HIRSUTISM: ICD-10-CM

## 2023-04-13 DIAGNOSIS — F32.A DEPRESSION, UNSPECIFIED DEPRESSION TYPE: ICD-10-CM

## 2023-04-13 DIAGNOSIS — E66.01 CLASS 3 SEVERE OBESITY WITHOUT SERIOUS COMORBIDITY WITH BODY MASS INDEX (BMI) OF 45.0 TO 49.9 IN ADULT, UNSPECIFIED OBESITY TYPE: ICD-10-CM

## 2023-04-13 DIAGNOSIS — N91.5 OLIGOMENORRHEA, UNSPECIFIED TYPE: ICD-10-CM

## 2023-04-13 DIAGNOSIS — E03.9 HYPOTHYROIDISM, UNSPECIFIED TYPE: ICD-10-CM

## 2023-04-13 DIAGNOSIS — E06.3 HASHIMOTO'S DISEASE: ICD-10-CM

## 2023-04-13 DIAGNOSIS — L73.2 HIDRADENITIS SUPPURATIVA: ICD-10-CM

## 2023-04-13 DIAGNOSIS — R61 EXCESSIVE SWEATING: ICD-10-CM

## 2023-04-13 DIAGNOSIS — F17.200 TOBACCO USE DISORDER: ICD-10-CM

## 2023-04-13 DIAGNOSIS — Z23 NEED FOR VACCINATION: ICD-10-CM

## 2023-04-13 DIAGNOSIS — Z00.00 ENCOUNTER FOR ANNUAL HEALTH EXAMINATION: Primary | ICD-10-CM

## 2023-04-13 DIAGNOSIS — Z00.00 ENCOUNTER FOR ANNUAL HEALTH EXAMINATION: ICD-10-CM

## 2023-04-13 DIAGNOSIS — R41.840 ATTENTION AND CONCENTRATION DEFICIT: ICD-10-CM

## 2023-04-13 DIAGNOSIS — G47.33 OSA ON CPAP: ICD-10-CM

## 2023-04-13 LAB
ALBUMIN SERPL BCP-MCNC: 3.3 G/DL (ref 3.5–5.2)
ALP SERPL-CCNC: 133 U/L (ref 55–135)
ALT SERPL W/O P-5'-P-CCNC: 136 U/L (ref 10–44)
ANION GAP SERPL CALC-SCNC: 12 MMOL/L (ref 8–16)
AST SERPL-CCNC: 211 U/L (ref 10–40)
BASOPHILS # BLD AUTO: 0.06 K/UL (ref 0–0.2)
BASOPHILS NFR BLD: 0.6 % (ref 0–1.9)
BILIRUB SERPL-MCNC: 0.7 MG/DL (ref 0.1–1)
BUN SERPL-MCNC: 6 MG/DL (ref 6–20)
CALCIUM SERPL-MCNC: 9.5 MG/DL (ref 8.7–10.5)
CHLORIDE SERPL-SCNC: 105 MMOL/L (ref 95–110)
CHOLEST SERPL-MCNC: 195 MG/DL (ref 120–199)
CHOLEST/HDLC SERPL: 6.3 {RATIO} (ref 2–5)
CO2 SERPL-SCNC: 20 MMOL/L (ref 23–29)
CREAT SERPL-MCNC: 0.6 MG/DL (ref 0.5–1.4)
DIFFERENTIAL METHOD: ABNORMAL
EOSINOPHIL # BLD AUTO: 0.6 K/UL (ref 0–0.5)
EOSINOPHIL NFR BLD: 5.7 % (ref 0–8)
ERYTHROCYTE [DISTWIDTH] IN BLOOD BY AUTOMATED COUNT: 13.6 % (ref 11.5–14.5)
EST. GFR  (NO RACE VARIABLE): >60 ML/MIN/1.73 M^2
FSH SERPL-ACNC: 4.4 MIU/ML
GLUCOSE SERPL-MCNC: 142 MG/DL (ref 70–110)
HCG INTACT+B SERPL-ACNC: <2.4 MIU/ML
HCT VFR BLD AUTO: 43.6 % (ref 37–48.5)
HDLC SERPL-MCNC: 31 MG/DL (ref 40–75)
HDLC SERPL: 15.9 % (ref 20–50)
HGB BLD-MCNC: 14 G/DL (ref 12–16)
IMM GRANULOCYTES # BLD AUTO: 0.04 K/UL (ref 0–0.04)
IMM GRANULOCYTES NFR BLD AUTO: 0.4 % (ref 0–0.5)
LDLC SERPL CALC-MCNC: 133.6 MG/DL (ref 63–159)
LYMPHOCYTES # BLD AUTO: 2.2 K/UL (ref 1–4.8)
LYMPHOCYTES NFR BLD: 20.3 % (ref 18–48)
MCH RBC QN AUTO: 35.1 PG (ref 27–31)
MCHC RBC AUTO-ENTMCNC: 32.1 G/DL (ref 32–36)
MCV RBC AUTO: 109 FL (ref 82–98)
MONOCYTES # BLD AUTO: 0.5 K/UL (ref 0.3–1)
MONOCYTES NFR BLD: 4.1 % (ref 4–15)
NEUTROPHILS # BLD AUTO: 7.5 K/UL (ref 1.8–7.7)
NEUTROPHILS NFR BLD: 68.9 % (ref 38–73)
NONHDLC SERPL-MCNC: 164 MG/DL
NRBC BLD-RTO: 0 /100 WBC
PLATELET # BLD AUTO: 292 K/UL (ref 150–450)
PMV BLD AUTO: 9.9 FL (ref 9.2–12.9)
POTASSIUM SERPL-SCNC: 3.8 MMOL/L (ref 3.5–5.1)
PROLACTIN SERPL IA-MCNC: 16.4 NG/ML (ref 5.2–26.5)
PROT SERPL-MCNC: 7.7 G/DL (ref 6–8.4)
RBC # BLD AUTO: 3.99 M/UL (ref 4–5.4)
SODIUM SERPL-SCNC: 137 MMOL/L (ref 136–145)
T3 SERPL-MCNC: 121 NG/DL (ref 60–180)
T4 FREE SERPL-MCNC: 0.91 NG/DL (ref 0.71–1.51)
THYROGLOB AB SERPL IA-ACNC: <4 IU/ML (ref 0–3.9)
THYROPEROXIDASE IGG SERPL-ACNC: 82.3 IU/ML
TRIGL SERPL-MCNC: 152 MG/DL (ref 30–150)
TSH SERPL DL<=0.005 MIU/L-ACNC: 4.78 UIU/ML (ref 0.4–4)
WBC # BLD AUTO: 10.85 K/UL (ref 3.9–12.7)

## 2023-04-13 PROCEDURE — 3008F PR BODY MASS INDEX (BMI) DOCUMENTED: ICD-10-PCS | Mod: CPTII,,, | Performed by: NURSE PRACTITIONER

## 2023-04-13 PROCEDURE — 3074F PR MOST RECENT SYSTOLIC BLOOD PRESSURE < 130 MM HG: ICD-10-PCS | Mod: CPTII,,, | Performed by: NURSE PRACTITIONER

## 2023-04-13 PROCEDURE — 84403 ASSAY OF TOTAL TESTOSTERONE: CPT | Performed by: NURSE PRACTITIONER

## 2023-04-13 PROCEDURE — 84443 ASSAY THYROID STIM HORMONE: CPT | Performed by: NURSE PRACTITIONER

## 2023-04-13 PROCEDURE — 3008F BODY MASS INDEX DOCD: CPT | Mod: CPTII,,, | Performed by: NURSE PRACTITIONER

## 2023-04-13 PROCEDURE — 84439 ASSAY OF FREE THYROXINE: CPT | Performed by: NURSE PRACTITIONER

## 2023-04-13 PROCEDURE — 99385 PR PREVENTIVE VISIT,NEW,18-39: ICD-10-PCS | Mod: S$PBB,,, | Performed by: NURSE PRACTITIONER

## 2023-04-13 PROCEDURE — 80053 COMPREHEN METABOLIC PANEL: CPT | Performed by: NURSE PRACTITIONER

## 2023-04-13 PROCEDURE — 99999 PR PBB SHADOW E&M-EST. PATIENT-LVL V: CPT | Mod: PBBFAC,,, | Performed by: NURSE PRACTITIONER

## 2023-04-13 PROCEDURE — 84146 ASSAY OF PROLACTIN: CPT | Performed by: NURSE PRACTITIONER

## 2023-04-13 PROCEDURE — 83036 HEMOGLOBIN GLYCOSYLATED A1C: CPT | Performed by: NURSE PRACTITIONER

## 2023-04-13 PROCEDURE — 1160F RVW MEDS BY RX/DR IN RCRD: CPT | Mod: CPTII,,, | Performed by: NURSE PRACTITIONER

## 2023-04-13 PROCEDURE — 1159F MED LIST DOCD IN RCRD: CPT | Mod: CPTII,,, | Performed by: NURSE PRACTITIONER

## 2023-04-13 PROCEDURE — 80061 LIPID PANEL: CPT | Performed by: NURSE PRACTITIONER

## 2023-04-13 PROCEDURE — 83001 ASSAY OF GONADOTROPIN (FSH): CPT | Performed by: NURSE PRACTITIONER

## 2023-04-13 PROCEDURE — 90715 TDAP VACCINE 7 YRS/> IM: CPT | Mod: PBBFAC,PO

## 2023-04-13 PROCEDURE — 36415 COLL VENOUS BLD VENIPUNCTURE: CPT | Mod: PO | Performed by: NURSE PRACTITIONER

## 2023-04-13 PROCEDURE — 3080F PR MOST RECENT DIASTOLIC BLOOD PRESSURE >= 90 MM HG: ICD-10-PCS | Mod: CPTII,,, | Performed by: NURSE PRACTITIONER

## 2023-04-13 PROCEDURE — 86803 HEPATITIS C AB TEST: CPT | Performed by: NURSE PRACTITIONER

## 2023-04-13 PROCEDURE — 99215 OFFICE O/P EST HI 40 MIN: CPT | Mod: PBBFAC,PO | Performed by: NURSE PRACTITIONER

## 2023-04-13 PROCEDURE — 86800 THYROGLOBULIN ANTIBODY: CPT | Performed by: NURSE PRACTITIONER

## 2023-04-13 PROCEDURE — 99999 PR PBB SHADOW E&M-EST. PATIENT-LVL V: ICD-10-PCS | Mod: PBBFAC,,, | Performed by: NURSE PRACTITIONER

## 2023-04-13 PROCEDURE — 86376 MICROSOMAL ANTIBODY EACH: CPT | Performed by: NURSE PRACTITIONER

## 2023-04-13 PROCEDURE — 84702 CHORIONIC GONADOTROPIN TEST: CPT | Performed by: NURSE PRACTITIONER

## 2023-04-13 PROCEDURE — 3080F DIAST BP >= 90 MM HG: CPT | Mod: CPTII,,, | Performed by: NURSE PRACTITIONER

## 2023-04-13 PROCEDURE — 1159F PR MEDICATION LIST DOCUMENTED IN MEDICAL RECORD: ICD-10-PCS | Mod: CPTII,,, | Performed by: NURSE PRACTITIONER

## 2023-04-13 PROCEDURE — 85025 COMPLETE CBC W/AUTO DIFF WBC: CPT | Performed by: NURSE PRACTITIONER

## 2023-04-13 PROCEDURE — 84480 ASSAY TRIIODOTHYRONINE (T3): CPT | Performed by: NURSE PRACTITIONER

## 2023-04-13 PROCEDURE — 3074F SYST BP LT 130 MM HG: CPT | Mod: CPTII,,, | Performed by: NURSE PRACTITIONER

## 2023-04-13 PROCEDURE — 87389 HIV-1 AG W/HIV-1&-2 AB AG IA: CPT | Performed by: NURSE PRACTITIONER

## 2023-04-13 PROCEDURE — 1160F PR REVIEW ALL MEDS BY PRESCRIBER/CLIN PHARMACIST DOCUMENTED: ICD-10-PCS | Mod: CPTII,,, | Performed by: NURSE PRACTITIONER

## 2023-04-13 PROCEDURE — 90471 IMMUNIZATION ADMIN: CPT | Mod: PBBFAC,PO

## 2023-04-13 PROCEDURE — 99385 PREV VISIT NEW AGE 18-39: CPT | Mod: S$PBB,,, | Performed by: NURSE PRACTITIONER

## 2023-04-13 RX ORDER — LISDEXAMFETAMINE DIMESYLATE 60 MG/1
60 CAPSULE ORAL EVERY MORNING
COMMUNITY
Start: 2023-04-05

## 2023-04-13 NOTE — PROGRESS NOTES
Subjective:       Patient ID: Sho Castaneda is a 28 y.o. female.    Chief Complaint: Annual Exam (Need to establish care)    Sho Castaneda is a 28 y.o. female who presents today to establish care.     Patient was diagnosed with HD and is interested in treatment. Also notes excessive sweating, irregular menses and weight gain.     Past Medical History:   Diagnosis Date    ADD (attention deficit disorder)     ADHD (attention deficit hyperactivity disorder)     Hypothyroidism     Mental disorder     Sleep apnea      Past Surgical History:   Procedure Laterality Date    PILONIDAL CYST DRAINAGE  2014    removed hair follicale on tailbone    TONSILLECTOMY       Social History     Socioeconomic History    Marital status: Single   Occupational History    Occupation: teacher at [employer]     Employer: [employer]   Tobacco Use    Smoking status: Every Day     Packs/day: 0.50     Years: 12.00     Pack years: 6.00     Types: Cigarettes     Start date: 7/9/2013    Smokeless tobacco: Never   Substance and Sexual Activity    Alcohol use: Yes     Alcohol/week: 0.0 standard drinks     Comment: occasionally    Drug use: No    Sexual activity: Not Currently   Other Topics Concern    Are you pregnant or think you may be? No    Breast-feeding No     Family History   Problem Relation Age of Onset    Thyroid disease Mother     Depression Mother     Thyroid nodules Mother     Depression Father     Hypertension Father     Sleep apnea Father     Depression Sister     Depression Brother     Breast cancer Maternal Grandmother     Psoriasis Neg Hx     Lupus Neg Hx     Melanoma Neg Hx     Thyroid cancer Neg Hx          Health Maintenance    MMG  PAP   DEXA  Colonoscopy  Occult Blood/Cologuard  Flu Vaccine  Pneumonia 23 Vaccine  Pneumonia 13 Vaccine  Tetanus/Tdap  Zoster Vaccine  Hepatitis C Screen: 04/13/2023   HIV Screen: 04/13/2023       Patient ambulates on her own without an assistive device.     On average, how many days per week do  you do moderate to strenuous exercise:  (like a brisk walk or jog; this does not include your job/work)  0     On average, how many minutes do you exercise at this level each day? 0    Have you ever used tobacco products? Yes    Have you ever been screened for HIV? No    Do you still have a menstrual cycle? Yes       If yes, please describe your cycles: Irregular    Do you wear contacts, glasses, reading glasses? Yes    Review of patient's allergies indicates:   Allergen Reactions    Benzoyl peroxide Other (See Comments)       Medication List with Changes/Refills   Current Medications    DEXTROAMPHETAMINE-AMPHETAMINE 30 MG TAB    TK 1 T PO  EVERY EVENING    FLUOXETINE 20 MG CAPSULE    Take 20 mg by mouth. 1 Capsule Oral Every day    LEVOTHYROXINE (SYNTHROID) 25 MCG TABLET    Take 1 tablet (25 mcg total) by mouth once daily.    LIOTHYRONINE (CYTOMEL) 5 MCG TAB    Take 1 tablet (5 mcg total) by mouth once daily.    VYVANSE 60 MG CAPSULE    Take 60 mg by mouth every morning.   Discontinued Medications    ALBUTEROL (PROVENTIL/VENTOLIN HFA) 90 MCG/ACTUATION INHALER    Inhale 2 puffs into the lungs every 6 (six) hours as needed for Wheezing.    DEXTROAMPHETAMINE (DEXTROSTAT) 10 MG TABLET    Take 10 mg by mouth every evening.    DEXTROAMPHETAMINE-AMPHETAMINE (ADDERALL) 20 MG TABLET    Take 1 tablet by mouth once daily.    ERGOCALCIFEROL, VITAMIN D2, (VITAMIN D ORAL)    Take 2,000 Units by mouth once daily.    HYDROCORTISONE BUTYRATE (LOCOID) 0.1 % SOLN    Apply 1 application topically 2 (two) times daily. Qd- bid Prn flare    KETOCONAZOLE (NIZORAL) 2 % SHAMPOO    Apply topically every other day. Apply to damp scalp, lather, and let sit 5 minutes before rinsing    VYVANSE 50 MG CAPSULE    Take 50 mg by mouth every morning. Pt state taking 60mg daily       Review of Systems   Constitutional:  Negative for chills and fever.   Respiratory:  Negative for cough and shortness of breath.    Cardiovascular:  Negative for chest  "pain.   Neurological:  Negative for dizziness and headaches.     Objective:   BP (!) 124/96 (BP Location: Right arm, Patient Position: Sitting, BP Method: Large (Manual))   Pulse (!) 120   Temp 98.4 °F (36.9 °C) (Temporal)   Resp 18   Ht 5' 8.5" (1.74 m)   Wt (!) 146.8 kg (323 lb 10.2 oz)   LMP  (LMP Unknown) Comment: Irregular periods  SpO2 96%   BMI 48.49 kg/m²     Physical Exam  Vitals reviewed.   Constitutional:       Appearance: Normal appearance.   HENT:      Head: Normocephalic and atraumatic.   Cardiovascular:      Rate and Rhythm: Regular rhythm. Tachycardia present.      Heart sounds: Normal heart sounds. No murmur heard.  Pulmonary:      Effort: Pulmonary effort is normal.      Breath sounds: Normal breath sounds. No wheezing.   Skin:     General: Skin is warm and dry.   Neurological:      Mental Status: She is alert and oriented to person, place, and time.     Assessment and Plan:       1. Encounter for annual health examination    --Nutrition: Discussed the importance of moderate caffeine intake. Adhering to a low sodium, low saturated fat/low cholesterol diet.   --Exercise: Discussed the importance of regular exercise. At least 30 minutes 5 days per week.   --Immunizations reviewed.  --Discussed benefits of maintaining up to date health maintenance.    - CBC Auto Differential; Future  - Comprehensive Metabolic Panel; Future  - Lipid Panel; Future  - TSH; Future  - Hepatitis C Antibody; Future  - HIV 1/2 Ag/Ab (4th Gen); Future  - Ambulatory referral/consult to Obstetrics / Gynecology; Future    2. Oligomenorrhea, unspecified type  3. Hirsutism    - Ambulatory referral/consult to Obstetrics / Gynecology; Future  - HCG, QUANTITATIVE, PREGNANCY; Future  - PROLACTIN; Future  - FOLLICLE STIMULATING HORMONE; Future  - TESTOSTERONE; Future    4. Excessive sweating  5. Hidradenitis suppurativa    - Ambulatory referral/consult to Dermatology; Future    6. Attention and concentration deficit  7. " Depression, unspecified depression type    Chronic, stable, continue current medications, follow up with Psych     8. Hashimoto's disease  9. Hypothyroidism, unspecified type    Chronic, labs to assess stability.      - THYROID PEROXIDASE ANTIBODY; Future  - TSH; Future  - T4, FREE; Future  - T3; Future  - Anti-Thyroglobulin Antibody; Future  - Ambulatory referral/consult to Endocrinology; Future    10. RODRIGUE on CPAP    - Ambulatory referral/consult to Sleep Disorders; Future    11. Class 3 severe obesity without serious comorbidity with body mass index (BMI) of 45.0 to 49.9 in adult, unspecified obesity type    - Hemoglobin A1C; Future    12. Need for vaccination    - (In Office Administered) Tdap Vaccine    13. Tobacco use disorder                -- Smoking cessation was discussed with patient - urged to stop.

## 2023-04-14 LAB
ESTIMATED AVG GLUCOSE: 114 MG/DL (ref 68–131)
HBA1C MFR BLD: 5.6 % (ref 4–5.6)
HCV AB SERPL QL IA: NORMAL
HIV 1+2 AB+HIV1 P24 AG SERPL QL IA: NORMAL
TESTOST SERPL-MCNC: 31 NG/DL (ref 5–73)

## 2023-04-16 DIAGNOSIS — R74.01 TRANSAMINITIS: Primary | ICD-10-CM

## 2023-04-20 ENCOUNTER — OFFICE VISIT (OUTPATIENT)
Dept: ENDOCRINOLOGY | Facility: CLINIC | Age: 28
End: 2023-04-20
Payer: MEDICAID

## 2023-04-20 ENCOUNTER — OFFICE VISIT (OUTPATIENT)
Dept: SLEEP MEDICINE | Facility: CLINIC | Age: 28
End: 2023-04-20
Payer: MEDICAID

## 2023-04-20 VITALS
DIASTOLIC BLOOD PRESSURE: 93 MMHG | WEIGHT: 293 LBS | HEART RATE: 120 BPM | BODY MASS INDEX: 48.59 KG/M2 | SYSTOLIC BLOOD PRESSURE: 143 MMHG

## 2023-04-20 VITALS — WEIGHT: 293 LBS | HEIGHT: 68 IN | BODY MASS INDEX: 44.41 KG/M2

## 2023-04-20 DIAGNOSIS — E03.9 HYPOTHYROIDISM, UNSPECIFIED TYPE: Primary | ICD-10-CM

## 2023-04-20 DIAGNOSIS — G47.33 OSA ON CPAP: ICD-10-CM

## 2023-04-20 DIAGNOSIS — E06.3 HASHIMOTO'S DISEASE: ICD-10-CM

## 2023-04-20 DIAGNOSIS — E66.01 SEVERE OBESITY (BMI >= 40): ICD-10-CM

## 2023-04-20 PROCEDURE — 99999 PR PBB SHADOW E&M-EST. PATIENT-LVL IV: ICD-10-PCS | Mod: PBBFAC,,, | Performed by: INTERNAL MEDICINE

## 2023-04-20 PROCEDURE — 3044F PR MOST RECENT HEMOGLOBIN A1C LEVEL <7.0%: ICD-10-PCS | Mod: CPTII,,, | Performed by: INTERNAL MEDICINE

## 2023-04-20 PROCEDURE — 99999 PR PBB SHADOW E&M-EST. PATIENT-LVL III: CPT | Mod: PBBFAC,,, | Performed by: NURSE PRACTITIONER

## 2023-04-20 PROCEDURE — 99213 OFFICE O/P EST LOW 20 MIN: CPT | Mod: 25,PBBFAC | Performed by: NURSE PRACTITIONER

## 2023-04-20 PROCEDURE — 99203 PR OFFICE/OUTPT VISIT, NEW, LEVL III, 30-44 MIN: ICD-10-PCS | Mod: S$PBB,,, | Performed by: NURSE PRACTITIONER

## 2023-04-20 PROCEDURE — 1159F MED LIST DOCD IN RCRD: CPT | Mod: CPTII,,, | Performed by: NURSE PRACTITIONER

## 2023-04-20 PROCEDURE — 99204 PR OFFICE/OUTPT VISIT, NEW, LEVL IV, 45-59 MIN: ICD-10-PCS | Mod: S$PBB,,, | Performed by: INTERNAL MEDICINE

## 2023-04-20 PROCEDURE — 3077F PR MOST RECENT SYSTOLIC BLOOD PRESSURE >= 140 MM HG: ICD-10-PCS | Mod: CPTII,,, | Performed by: NURSE PRACTITIONER

## 2023-04-20 PROCEDURE — 1159F PR MEDICATION LIST DOCUMENTED IN MEDICAL RECORD: ICD-10-PCS | Mod: CPTII,,, | Performed by: INTERNAL MEDICINE

## 2023-04-20 PROCEDURE — 1159F MED LIST DOCD IN RCRD: CPT | Mod: CPTII,,, | Performed by: INTERNAL MEDICINE

## 2023-04-20 PROCEDURE — 99204 OFFICE O/P NEW MOD 45 MIN: CPT | Mod: S$PBB,,, | Performed by: INTERNAL MEDICINE

## 2023-04-20 PROCEDURE — 3044F HG A1C LEVEL LT 7.0%: CPT | Mod: CPTII,,, | Performed by: INTERNAL MEDICINE

## 2023-04-20 PROCEDURE — 99999 PR PBB SHADOW E&M-EST. PATIENT-LVL III: ICD-10-PCS | Mod: PBBFAC,,, | Performed by: NURSE PRACTITIONER

## 2023-04-20 PROCEDURE — 3008F BODY MASS INDEX DOCD: CPT | Mod: CPTII,,, | Performed by: INTERNAL MEDICINE

## 2023-04-20 PROCEDURE — 1160F RVW MEDS BY RX/DR IN RCRD: CPT | Mod: CPTII,,, | Performed by: INTERNAL MEDICINE

## 2023-04-20 PROCEDURE — 99999 PR PBB SHADOW E&M-EST. PATIENT-LVL IV: CPT | Mod: PBBFAC,,, | Performed by: INTERNAL MEDICINE

## 2023-04-20 PROCEDURE — 3077F SYST BP >= 140 MM HG: CPT | Mod: CPTII,,, | Performed by: NURSE PRACTITIONER

## 2023-04-20 PROCEDURE — 3080F PR MOST RECENT DIASTOLIC BLOOD PRESSURE >= 90 MM HG: ICD-10-PCS | Mod: CPTII,,, | Performed by: NURSE PRACTITIONER

## 2023-04-20 PROCEDURE — 1159F PR MEDICATION LIST DOCUMENTED IN MEDICAL RECORD: ICD-10-PCS | Mod: CPTII,,, | Performed by: NURSE PRACTITIONER

## 2023-04-20 PROCEDURE — 3008F PR BODY MASS INDEX (BMI) DOCUMENTED: ICD-10-PCS | Mod: CPTII,,, | Performed by: INTERNAL MEDICINE

## 2023-04-20 PROCEDURE — 3080F DIAST BP >= 90 MM HG: CPT | Mod: CPTII,,, | Performed by: NURSE PRACTITIONER

## 2023-04-20 PROCEDURE — 99203 OFFICE O/P NEW LOW 30 MIN: CPT | Mod: S$PBB,,, | Performed by: NURSE PRACTITIONER

## 2023-04-20 PROCEDURE — 3008F PR BODY MASS INDEX (BMI) DOCUMENTED: ICD-10-PCS | Mod: CPTII,,, | Performed by: NURSE PRACTITIONER

## 2023-04-20 PROCEDURE — 99214 OFFICE O/P EST MOD 30 MIN: CPT | Mod: PBBFAC,27 | Performed by: INTERNAL MEDICINE

## 2023-04-20 PROCEDURE — 1160F PR REVIEW ALL MEDS BY PRESCRIBER/CLIN PHARMACIST DOCUMENTED: ICD-10-PCS | Mod: CPTII,,, | Performed by: INTERNAL MEDICINE

## 2023-04-20 PROCEDURE — 3008F BODY MASS INDEX DOCD: CPT | Mod: CPTII,,, | Performed by: NURSE PRACTITIONER

## 2023-04-20 PROCEDURE — 3044F PR MOST RECENT HEMOGLOBIN A1C LEVEL <7.0%: ICD-10-PCS | Mod: CPTII,,, | Performed by: NURSE PRACTITIONER

## 2023-04-20 PROCEDURE — 3044F HG A1C LEVEL LT 7.0%: CPT | Mod: CPTII,,, | Performed by: NURSE PRACTITIONER

## 2023-04-20 RX ORDER — LEVOTHYROXINE SODIUM 50 UG/1
50 TABLET ORAL
Qty: 90 TABLET | Refills: 3 | Status: SHIPPED | OUTPATIENT
Start: 2023-04-20 | End: 2024-04-19

## 2023-04-20 RX ORDER — DEXAMETHASONE 1 MG/1
TABLET ORAL
Qty: 1 TABLET | Refills: 1 | Status: SHIPPED | OUTPATIENT
Start: 2023-04-20

## 2023-04-20 NOTE — PROGRESS NOTES
Subjective:      Patient ID: Sho Castaneda is a 28 y.o. female.    Chief Complaint:  Hypothyroidism      History of Present Illness  Ms. Castaneda presents to re establish care for hypothyroidism. Last visit 1/2018.      Has active history of hypothyroidism secondary to Hashimoto's, Hidradenitis suppurativa RODRIGUE and ADD.     First diagnosed with hypothyroidism in 2011. At initial visit she was on levothyroxine 50 mcg once daily. We changed her regimen to levothyroxine 25 mcg once daily and Cytomel 5 mcg daily. Currently she has been out of the medication for about 1 year.     Has overt fatigue but stable and ADHD medication improves fatigue. Still has significant mental fog. BM's normal. No cold intolerance.     She has been compliant with CPAP for RODRIGUE.    She has gained over 70 lbs in the past 3-4 years which she attributes to diet and relative inactivity. Recently she has noticed excessive sweating mostly on her head. She does report some striae on the abdomen. Denies any easy bruising or proximal muscle weakness. No recent or chronic glucocorticoid exposure.     Review of Systems   Constitutional:  Negative for chills and fever.   Gastrointestinal:  Negative for nausea.     Objective:   Physical Exam  Vitals and nursing note reviewed.   Has thin red striae on flank and abdomen  Has dorsocervical/supraclavicular fullness and increased facial roundness  No bruising    BP Readings from Last 3 Encounters:   04/20/23 (!) 143/93   04/13/23 (!) 124/96   08/27/18 (!) 150/95     Wt Readings from Last 1 Encounters:   04/20/23 1340 (!) 147.6 kg (325 lb 6.4 oz)       Body mass index is 49.48 kg/m².    Lab Review:   Lab Results   Component Value Date    HGBA1C 5.6 04/13/2023     Lab Results   Component Value Date    CHOL 195 04/13/2023    HDL 31 (L) 04/13/2023    LDLCALC 133.6 04/13/2023    TRIG 152 (H) 04/13/2023    CHOLHDL 15.9 (L) 04/13/2023     Lab Results   Component Value Date     04/13/2023    K 3.8 04/13/2023      04/13/2023    CO2 20 (L) 04/13/2023     (H) 04/13/2023    BUN 6 04/13/2023    CREATININE 0.6 04/13/2023    CALCIUM 9.5 04/13/2023    PROT 7.7 04/13/2023    ALBUMIN 3.3 (L) 04/13/2023    BILITOT 0.7 04/13/2023    ALKPHOS 133 04/13/2023     (H) 04/13/2023     (H) 04/13/2023    ANIONGAP 12 04/13/2023    ESTGFRAFRICA >60.0 07/06/2016    EGFRNONAA >60.0 07/06/2016    TSH 4.778 (H) 04/13/2023         Assessment and Plan     Hypothyroidism  --Patient with subclinical hypothyroidism secondary to Hashimoto's  --She has been off of thyroid hormone for >1 year and TSH is slightly elevated  --Will restart levothyroxine at 50 mcg once daily  --Repeat TSH in 6-8 weeks  --Goal is for a normal TSH to avoid CV and bone complications    Hashimoto's disease  --TPO positive    Severe obesity (BMI >= 40)  --With significant weight gain over the past 3-4 years which she attributes to lifestyle  --She is now working on her diet and calorie restriction  --Will r/o Cushing's with 1 mg LDDST given clinical findings      Needs 8 AM labs in 4 weeks, follow up in one year      Manuel Travis M.D. Staff Endocrinology

## 2023-04-20 NOTE — PROGRESS NOTES
Sho Castaneda a 28 y.o. female  returns today regarding the management of obstructive sleep apnea. Last seen 2017    Since last seen, she has continued to use apap nightly consistently. Continued resolution of snoring, disrupted sleep. Feels well rested in am. Denies interval medical change. Not registered her outdated respironics machine yet.    Previous Interrogation- 90% tile 8.7cm. avg use 8:12h/n. Nuance mask. AHI 1.7, 0% leak. 0% periodic. 30d>4h.     HST AHI 5/low sat 83%. (Nov 2014, 246#)       BP (!) 143/93 (BP Location: Left arm, Patient Position: Sitting, BP Method: Large (Automatic))   Pulse (!) 120   Wt (!) 147.1 kg (324 lb 4.8 oz)   LMP  (LMP Unknown) Comment: Irregular periods  BMI 48.59 kg/m²       ASSESSMENT:   1. Obstructive sleep apnea, mild significant, continued excellent adherence, benefits from therapy. Eligible new machine    PLAN:   1. Continue APAP 7-10cm, GET  new machine  [facility] [place]  Rtc bt/ 31-90d after setup adherence/ins use guidelines

## 2023-04-21 NOTE — ASSESSMENT & PLAN NOTE
--With significant weight gain over the past 3-4 years which she attributes to lifestyle  --She is now working on her diet and calorie restriction  --Will r/o Cushing's with 1 mg LDDST given clinical findings

## 2023-04-21 NOTE — ASSESSMENT & PLAN NOTE
--Patient with subclinical hypothyroidism secondary to Hashimoto's  --She has been off of thyroid hormone for >1 year and TSH is slightly elevated  --Will restart levothyroxine at 50 mcg once daily  --Repeat TSH in 6-8 weeks  --Goal is for a normal TSH to avoid CV and bone complications

## 2023-05-17 ENCOUNTER — PATIENT MESSAGE (OUTPATIENT)
Dept: INTERNAL MEDICINE | Facility: CLINIC | Age: 28
End: 2023-05-17
Payer: MEDICAID

## 2023-05-19 ENCOUNTER — OFFICE VISIT (OUTPATIENT)
Dept: INTERNAL MEDICINE | Facility: CLINIC | Age: 28
End: 2023-05-19
Payer: MEDICAID

## 2023-05-19 ENCOUNTER — HOSPITAL ENCOUNTER (OUTPATIENT)
Facility: HOSPITAL | Age: 28
Discharge: HOME | End: 2023-05-21
Attending: EMERGENCY MEDICINE | Admitting: STUDENT IN AN ORGANIZED HEALTH CARE EDUCATION/TRAINING PROGRAM
Payer: MEDICAID

## 2023-05-19 DIAGNOSIS — R10.30 LOWER ABDOMINAL PAIN: ICD-10-CM

## 2023-05-19 DIAGNOSIS — K35.30 ACUTE APPENDICITIS WITH LOCALIZED PERITONITIS, WITHOUT PERFORATION, ABSCESS, OR GANGRENE: ICD-10-CM

## 2023-05-19 DIAGNOSIS — K37 APPENDICITIS, UNSPECIFIED APPENDICITIS TYPE: ICD-10-CM

## 2023-05-19 DIAGNOSIS — K35.33 ACUTE APPENDICITIS WITH PERFORATION, LOCALIZED PERITONITIS, ABSCESS, AND GANGRENE: Primary | ICD-10-CM

## 2023-05-19 DIAGNOSIS — R31.0 GROSS HEMATURIA: ICD-10-CM

## 2023-05-19 DIAGNOSIS — R10.9 ABDOMINAL PAIN, UNSPECIFIED ABDOMINAL LOCATION: Primary | ICD-10-CM

## 2023-05-19 LAB
ALBUMIN SERPL BCP-MCNC: 2.7 G/DL (ref 3.5–5.2)
ALP SERPL-CCNC: 105 U/L (ref 55–135)
ALT SERPL W/O P-5'-P-CCNC: 52 U/L (ref 10–44)
ANION GAP SERPL CALC-SCNC: 7 MMOL/L (ref 8–16)
AST SERPL-CCNC: 70 U/L (ref 10–40)
B-HCG UR QL: NEGATIVE
BACTERIA #/AREA URNS AUTO: ABNORMAL /HPF
BASOPHILS # BLD AUTO: 0.05 K/UL (ref 0–0.2)
BASOPHILS NFR BLD: 0.5 % (ref 0–1.9)
BILIRUB SERPL-MCNC: 0.8 MG/DL (ref 0.1–1)
BILIRUB UR QL STRIP: NEGATIVE
BUN SERPL-MCNC: 6 MG/DL (ref 6–20)
CALCIUM SERPL-MCNC: 9.6 MG/DL (ref 8.7–10.5)
CHLORIDE SERPL-SCNC: 104 MMOL/L (ref 95–110)
CLARITY UR REFRACT.AUTO: ABNORMAL
CO2 SERPL-SCNC: 26 MMOL/L (ref 23–29)
COLOR UR AUTO: ABNORMAL
CREAT SERPL-MCNC: 0.7 MG/DL (ref 0.5–1.4)
CTP QC/QA: YES
DIFFERENTIAL METHOD: ABNORMAL
EOSINOPHIL # BLD AUTO: 0.5 K/UL (ref 0–0.5)
EOSINOPHIL NFR BLD: 5 % (ref 0–8)
ERYTHROCYTE [DISTWIDTH] IN BLOOD BY AUTOMATED COUNT: 11.8 % (ref 11.5–14.5)
EST. GFR  (NO RACE VARIABLE): >60 ML/MIN/1.73 M^2
GLUCOSE SERPL-MCNC: 85 MG/DL (ref 70–110)
GLUCOSE UR QL STRIP: NEGATIVE
HCT VFR BLD AUTO: 42.1 % (ref 37–48.5)
HGB BLD-MCNC: 13.8 G/DL (ref 12–16)
HGB UR QL STRIP: ABNORMAL
HYALINE CASTS UR QL AUTO: 0 /LPF
IMM GRANULOCYTES # BLD AUTO: 0.07 K/UL (ref 0–0.04)
IMM GRANULOCYTES NFR BLD AUTO: 0.7 % (ref 0–0.5)
KETONES UR QL STRIP: ABNORMAL
LEUKOCYTE ESTERASE UR QL STRIP: ABNORMAL
LYMPHOCYTES # BLD AUTO: 1.4 K/UL (ref 1–4.8)
LYMPHOCYTES NFR BLD: 13.8 % (ref 18–48)
MCH RBC QN AUTO: 34.8 PG (ref 27–31)
MCHC RBC AUTO-ENTMCNC: 32.8 G/DL (ref 32–36)
MCV RBC AUTO: 106 FL (ref 82–98)
MICROSCOPIC COMMENT: ABNORMAL
MONOCYTES # BLD AUTO: 1 K/UL (ref 0.3–1)
MONOCYTES NFR BLD: 9.2 % (ref 4–15)
NEUTROPHILS # BLD AUTO: 7.4 K/UL (ref 1.8–7.7)
NEUTROPHILS NFR BLD: 70.8 % (ref 38–73)
NITRITE UR QL STRIP: NEGATIVE
NRBC BLD-RTO: 0 /100 WBC
PH UR STRIP: 6 [PH] (ref 5–8)
PLATELET # BLD AUTO: 316 K/UL (ref 150–450)
PMV BLD AUTO: 9.8 FL (ref 9.2–12.9)
POTASSIUM SERPL-SCNC: 3.8 MMOL/L (ref 3.5–5.1)
PROT SERPL-MCNC: 8.4 G/DL (ref 6–8.4)
PROT UR QL STRIP: ABNORMAL
RBC # BLD AUTO: 3.97 M/UL (ref 4–5.4)
RBC #/AREA URNS AUTO: >100 /HPF (ref 0–4)
SODIUM SERPL-SCNC: 137 MMOL/L (ref 136–145)
SP GR UR STRIP: 1.02 (ref 1–1.03)
SQUAMOUS #/AREA URNS AUTO: 13 /HPF
URN SPEC COLLECT METH UR: ABNORMAL
WBC # BLD AUTO: 10.37 K/UL (ref 3.9–12.7)
WBC #/AREA URNS AUTO: >100 /HPF (ref 0–5)

## 2023-05-19 PROCEDURE — 80053 COMPREHEN METABOLIC PANEL: CPT | Performed by: PHYSICIAN ASSISTANT

## 2023-05-19 PROCEDURE — G0378 HOSPITAL OBSERVATION PER HR: HCPCS

## 2023-05-19 PROCEDURE — 99223 PR INITIAL HOSPITAL CARE,LEVL III: ICD-10-PCS | Mod: 57,,, | Performed by: STUDENT IN AN ORGANIZED HEALTH CARE EDUCATION/TRAINING PROGRAM

## 2023-05-19 PROCEDURE — 63600175 PHARM REV CODE 636 W HCPCS: Performed by: PHYSICIAN ASSISTANT

## 2023-05-19 PROCEDURE — 87086 URINE CULTURE/COLONY COUNT: CPT | Performed by: PHYSICIAN ASSISTANT

## 2023-05-19 PROCEDURE — 3044F HG A1C LEVEL LT 7.0%: CPT | Mod: CPTII,95,, | Performed by: NURSE PRACTITIONER

## 2023-05-19 PROCEDURE — 99285 EMERGENCY DEPT VISIT HI MDM: CPT | Mod: 25

## 2023-05-19 PROCEDURE — 81001 URINALYSIS AUTO W/SCOPE: CPT | Performed by: PHYSICIAN ASSISTANT

## 2023-05-19 PROCEDURE — 1160F RVW MEDS BY RX/DR IN RCRD: CPT | Mod: CPTII,95,, | Performed by: NURSE PRACTITIONER

## 2023-05-19 PROCEDURE — 1159F MED LIST DOCD IN RCRD: CPT | Mod: CPTII,95,, | Performed by: NURSE PRACTITIONER

## 2023-05-19 PROCEDURE — 99223 1ST HOSP IP/OBS HIGH 75: CPT | Mod: 57,,, | Performed by: STUDENT IN AN ORGANIZED HEALTH CARE EDUCATION/TRAINING PROGRAM

## 2023-05-19 PROCEDURE — 99213 PR OFFICE/OUTPT VISIT, EST, LEVL III, 20-29 MIN: ICD-10-PCS | Mod: 95,,, | Performed by: NURSE PRACTITIONER

## 2023-05-19 PROCEDURE — 81025 URINE PREGNANCY TEST: CPT | Performed by: PHYSICIAN ASSISTANT

## 2023-05-19 PROCEDURE — 25500020 PHARM REV CODE 255: Performed by: EMERGENCY MEDICINE

## 2023-05-19 PROCEDURE — 96361 HYDRATE IV INFUSION ADD-ON: CPT

## 2023-05-19 PROCEDURE — 1160F PR REVIEW ALL MEDS BY PRESCRIBER/CLIN PHARMACIST DOCUMENTED: ICD-10-PCS | Mod: CPTII,95,, | Performed by: NURSE PRACTITIONER

## 2023-05-19 PROCEDURE — 96375 TX/PRO/DX INJ NEW DRUG ADDON: CPT

## 2023-05-19 PROCEDURE — 1159F PR MEDICATION LIST DOCUMENTED IN MEDICAL RECORD: ICD-10-PCS | Mod: CPTII,95,, | Performed by: NURSE PRACTITIONER

## 2023-05-19 PROCEDURE — 96365 THER/PROPH/DIAG IV INF INIT: CPT | Mod: 59

## 2023-05-19 PROCEDURE — 85025 COMPLETE CBC W/AUTO DIFF WBC: CPT | Performed by: PHYSICIAN ASSISTANT

## 2023-05-19 PROCEDURE — 99285 EMERGENCY DEPT VISIT HI MDM: CPT | Mod: FS,,, | Performed by: PHYSICIAN ASSISTANT

## 2023-05-19 PROCEDURE — 3044F PR MOST RECENT HEMOGLOBIN A1C LEVEL <7.0%: ICD-10-PCS | Mod: CPTII,95,, | Performed by: NURSE PRACTITIONER

## 2023-05-19 PROCEDURE — 99213 OFFICE O/P EST LOW 20 MIN: CPT | Mod: 95,,, | Performed by: NURSE PRACTITIONER

## 2023-05-19 PROCEDURE — 25000003 PHARM REV CODE 250: Performed by: PHYSICIAN ASSISTANT

## 2023-05-19 PROCEDURE — 99285 PR EMERGENCY DEPT VISIT,LEVEL V: ICD-10-PCS | Mod: FS,,, | Performed by: PHYSICIAN ASSISTANT

## 2023-05-19 RX ORDER — DEXTROSE MONOHYDRATE, SODIUM CHLORIDE, AND POTASSIUM CHLORIDE 50; 1.49; 4.5 G/1000ML; G/1000ML; G/1000ML
INJECTION, SOLUTION INTRAVENOUS CONTINUOUS
Status: DISCONTINUED | OUTPATIENT
Start: 2023-05-20 | End: 2023-05-20

## 2023-05-19 RX ORDER — IBUPROFEN 600 MG/1
600 TABLET ORAL EVERY 6 HOURS
Status: DISCONTINUED | OUTPATIENT
Start: 2023-05-19 | End: 2023-05-21 | Stop reason: HOSPADM

## 2023-05-19 RX ORDER — ENOXAPARIN SODIUM 100 MG/ML
40 INJECTION SUBCUTANEOUS EVERY 24 HOURS
Status: DISCONTINUED | OUTPATIENT
Start: 2023-05-20 | End: 2023-05-21 | Stop reason: HOSPADM

## 2023-05-19 RX ORDER — SODIUM CHLORIDE 0.9 % (FLUSH) 0.9 %
10 SYRINGE (ML) INJECTION
Status: DISCONTINUED | OUTPATIENT
Start: 2023-05-20 | End: 2023-05-21 | Stop reason: HOSPADM

## 2023-05-19 RX ORDER — ONDANSETRON 2 MG/ML
4 INJECTION INTRAMUSCULAR; INTRAVENOUS EVERY 6 HOURS PRN
Status: DISCONTINUED | OUTPATIENT
Start: 2023-05-20 | End: 2023-05-21 | Stop reason: HOSPADM

## 2023-05-19 RX ORDER — ACETAMINOPHEN 325 MG/1
650 TABLET ORAL
Status: COMPLETED | OUTPATIENT
Start: 2023-05-19 | End: 2023-05-19

## 2023-05-19 RX ORDER — FLUOXETINE HYDROCHLORIDE 20 MG/1
20 CAPSULE ORAL DAILY
Status: DISCONTINUED | OUTPATIENT
Start: 2023-05-20 | End: 2023-05-21 | Stop reason: HOSPADM

## 2023-05-19 RX ORDER — OXYCODONE HYDROCHLORIDE 5 MG/1
5 TABLET ORAL EVERY 4 HOURS PRN
Status: DISCONTINUED | OUTPATIENT
Start: 2023-05-20 | End: 2023-05-20

## 2023-05-19 RX ORDER — LEVOTHYROXINE SODIUM 50 UG/1
50 TABLET ORAL
Status: DISCONTINUED | OUTPATIENT
Start: 2023-05-20 | End: 2023-05-21 | Stop reason: HOSPADM

## 2023-05-19 RX ORDER — KETOROLAC TROMETHAMINE 30 MG/ML
10 INJECTION, SOLUTION INTRAMUSCULAR; INTRAVENOUS
Status: COMPLETED | OUTPATIENT
Start: 2023-05-19 | End: 2023-05-19

## 2023-05-19 RX ORDER — TALC
6 POWDER (GRAM) TOPICAL NIGHTLY PRN
Status: DISCONTINUED | OUTPATIENT
Start: 2023-05-20 | End: 2023-05-21 | Stop reason: HOSPADM

## 2023-05-19 RX ADMIN — ACETAMINOPHEN 650 MG: 325 TABLET ORAL at 04:05

## 2023-05-19 RX ADMIN — IOHEXOL 100 ML: 350 INJECTION, SOLUTION INTRAVENOUS at 06:05

## 2023-05-19 RX ADMIN — KETOROLAC TROMETHAMINE 10 MG: 30 INJECTION, SOLUTION INTRAMUSCULAR; INTRAVENOUS at 04:05

## 2023-05-19 RX ADMIN — PIPERACILLIN AND TAZOBACTAM 4.5 G: 4; .5 INJECTION, POWDER, LYOPHILIZED, FOR SOLUTION INTRAVENOUS; PARENTERAL at 08:05

## 2023-05-19 NOTE — PROGRESS NOTES
Subjective:       Patient ID: Sho Castaneda is a 28 y.o. female.    Chief Complaint: Abdominal Pain    Visit type: audiovisual    Patient is a 28 y.o. female who traditionally follows with Aydee Dexter NP located in [place] presenting today for:    Abdominal Pain  Patient notes that on Monday she was having severe abdominal pain with vomiting. On Wednesday she began to notice blood in her urine. Endorses low grade temp and chills. Abdominal pain has persisted and is painful enough to discourage movement. Has been able to keep fluids and some soup down but had decreased appetite.     Review of patient's allergies indicates:   Allergen Reactions    Benzoyl peroxide Other (See Comments)       Medication List with Changes/Refills   Current Medications    DEXAMETHASONE (DECADRON) 1 MG TAB    Take 1mg by mouth at 11 PM the night before 8 AM labs are drawn    DEXTROAMPHETAMINE-AMPHETAMINE 30 MG TAB    TK 1 T PO  EVERY EVENING    FLUOXETINE 20 MG CAPSULE    Take 20 mg by mouth. 1 Capsule Oral Every day    LEVOTHYROXINE (SYNTHROID) 50 MCG TABLET    Take 1 tablet (50 mcg total) by mouth before breakfast.    VYVANSE 60 MG CAPSULE    Take 60 mg by mouth every morning.     Medical, social and surgical history has been reviewed with the patient.      Review of Systems   Constitutional:  Positive for chills and fever.   Gastrointestinal:  Positive for abdominal pain and vomiting (now resolved). Negative for constipation and diarrhea.   Genitourinary:  Positive for flank pain and hematuria.     Objective:   There were no vitals taken for this visit.      Physical Exam  Vitals reviewed: Exam Limited due to Video Consultation.   Constitutional:       General: She is not in acute distress.  HENT:      Head: Normocephalic.   Abdominal:      Comments: Provider directed patient to palpate abdomen with reports of tenderness in the LLQ/pelvic region.    Neurological:      Mental Status: She is alert and oriented to person,  place, and time.       Last Labs:  Glucose   Date Value Ref Range Status   04/13/2023 142 (H) 70 - 110 mg/dL Final   07/06/2016 94 70 - 110 mg/dL Final     BUN   Date Value Ref Range Status   04/13/2023 6 6 - 20 mg/dL Final   07/06/2016 7 6 - 20 mg/dL Final     Creatinine   Date Value Ref Range Status   04/13/2023 0.6 0.5 - 1.4 mg/dL Final   07/06/2016 0.8 0.5 - 1.4 mg/dL Final     Cholesterol   Date Value Ref Range Status   04/13/2023 195 120 - 199 mg/dL Final     Comment:     The National Cholesterol Education Program (NCEP) has set the  following guidelines (reference ranges) for Cholesterol:  Optimal.....................<200 mg/dL  Borderline High.............200-239 mg/dL  High........................> or = 240 mg/dL     07/06/2016 197 120 - 199 mg/dL Final     Comment:     The National Cholesterol Education Program (NCEP) has set the  following guidelines (reference ranges) for Cholesterol:  Optimal.....................<200 mg/dL  Borderline High.............200-239 mg/dL  High........................> or = 240 mg/dL       Hemoglobin A1C   Date Value Ref Range Status   04/13/2023 5.6 4.0 - 5.6 % Final     Comment:     ADA Screening Guidelines:  5.7-6.4%  Consistent with prediabetes  >or=6.5%  Consistent with diabetes    High levels of fetal hemoglobin interfere with the HbA1C  assay. Heterozygous hemoglobin variants (HbS, HgC, etc)do  not significantly interfere with this assay.   However, presence of multiple variants may affect accuracy.     07/06/2016 5.4 4.5 - 6.2 % Final     Comment:     According to ADA guidelines, hemoglobin A1C <7.0% represents  optimal control in non-pregnant diabetic patients.  Different  metrics may apply to specific populations.   Standards of Medical Care in Diabetes - 2016.  For the purpose of screening for the presence of diabetes:  <5.7%     Consistent with the absence of diabetes  5.7-6.4%  Consistent with increasing risk for diabetes   (prediabetes)  >or=6.5%  Consistent with  diabetes  Currently no consensus exists for use of hemoglobin A1C  for diagnosis of diabetes for children.       Hemoglobin   Date Value Ref Range Status   04/13/2023 14.0 12.0 - 16.0 g/dL Final   07/06/2016 14.3 12.0 - 16.0 g/dL Final     Hematocrit   Date Value Ref Range Status   04/13/2023 43.6 37.0 - 48.5 % Final   07/06/2016 43.0 37.0 - 48.5 % Final       I have reviewed the following:     Details / Date    [x]   Labs     []   Micro     []   Pathology     []   Imaging     []   Cardiology Procedures     []   Other        Assessment and Plan:     1. Abdominal pain, unspecified abdominal location    Diverticulitis vs Kidney stone vs Appendicitis    Patient advised with her symptoms I would prefer she be evaluated in ED. Advised to go today.     Face to Face time with patient: 5  10 minutes of total time spent on the encounter, which includes face to face time and non-face to face time preparing to see the patient (eg, review of tests), Obtaining and/or reviewing separately obtained history, Documenting clinical information in the electronic or other health record, Independently interpreting results (not separately reported) and communicating results to the patient/family/caregiver, or Care coordination (not separately reported).       Each patient to whom he or she provides medical services by telemedicine is:  (1) informed of the relationship between the physician and patient and the respective role of any other health care provider with respect to management of the patient; and (2) notified that he or she may decline to receive medical services by telemedicine and may withdraw from such care at any time.

## 2023-05-19 NOTE — ED PROVIDER NOTES
Encounter Date: 5/19/2023       History     Chief Complaint   Patient presents with    Hematuria     Hematuria x 1 day associated with abdominal pain.      28-year-old female with pmhx of RODRIGUE and hypothyroidism presents with abdominal pain x6 days. Pain is mainly in her lower abdomen. She has associated hematuria starting yesterday, vomiting, decreased appetite, fever. T-max of 100.1°.  Last bowel movement was 6 days ago though she was not been eating much over the past few days. She denies diarrhea, vaginal discharge, vaginal bleeding, dysuria, flank pain.    The history is provided by the patient.   Review of patient's allergies indicates:   Allergen Reactions    Benzoyl peroxide Other (See Comments)     Past Medical History:   Diagnosis Date    ADD (attention deficit disorder)     ADHD (attention deficit hyperactivity disorder)     Hypothyroidism     Mental disorder     Sleep apnea      Past Surgical History:   Procedure Laterality Date    PILONIDAL CYST DRAINAGE  2014    removed hair follicale on tailbone    TONSILLECTOMY       Family History   Problem Relation Age of Onset    Thyroid disease Mother     Depression Mother     Thyroid nodules Mother     Depression Father     Hypertension Father     Sleep apnea Father     Depression Sister     Depression Brother     Breast cancer Maternal Grandmother     Psoriasis Neg Hx     Lupus Neg Hx     Melanoma Neg Hx     Thyroid cancer Neg Hx      Social History     Tobacco Use    Smoking status: Every Day     Packs/day: 0.50     Years: 12.00     Pack years: 6.00     Types: Cigarettes     Start date: 7/9/2013    Smokeless tobacco: Never   Substance Use Topics    Alcohol use: Yes     Alcohol/week: 0.0 standard drinks     Comment: occasionally    Drug use: No     Review of Systems   Constitutional:  Positive for appetite change, chills and fever.   Gastrointestinal:  Positive for abdominal pain, constipation and vomiting.   Genitourinary:  Positive for hematuria and pelvic  pain. Negative for dysuria, frequency, vaginal bleeding and vaginal discharge.   Musculoskeletal:  Positive for back pain.     Physical Exam     Initial Vitals [05/19/23 1400]   BP Pulse Resp Temp SpO2   (!) 142/91 (!) 116 20 99.3 °F (37.4 °C) 100 %      MAP       --         Physical Exam    Nursing note and vitals reviewed.  Constitutional: She appears well-developed and well-nourished. She is not diaphoretic. No distress.   HENT:   Head: Normocephalic and atraumatic.   Eyes: Conjunctivae and EOM are normal.   Neck: Neck supple.   Cardiovascular:  Normal rate and regular rhythm.           Pulmonary/Chest: Breath sounds normal. No respiratory distress.   Abdominal: Abdomen is soft. Bowel sounds are normal. She exhibits no distension and no mass. There is abdominal tenderness in the suprapubic area and left lower quadrant.   No right CVA tenderness.  No left CVA tenderness. There is no guarding, no tenderness at McBurney's point and negative Reid's sign.   Musculoskeletal:      Cervical back: Neck supple.     Neurological: She is alert and oriented to person, place, and time.   Skin: No rash noted.   Psychiatric: She has a normal mood and affect. Her behavior is normal. Judgment and thought content normal.       ED Course   Procedures  Labs Reviewed   COMPREHENSIVE METABOLIC PANEL - Abnormal; Notable for the following components:       Result Value    Albumin 2.7 (*)     AST 70 (*)     ALT 52 (*)     Anion Gap 7 (*)     All other components within normal limits   CBC W/ AUTO DIFFERENTIAL - Abnormal; Notable for the following components:    RBC 3.97 (*)      (*)     MCH 34.8 (*)     Immature Granulocytes 0.7 (*)     Immature Grans (Abs) 0.07 (*)     Lymph % 13.8 (*)     All other components within normal limits   URINALYSIS, REFLEX TO URINE CULTURE - Abnormal; Notable for the following components:    Color, UA Orange (*)     Protein, UA 1+ (*)     Ketones, UA Trace (*)     Occult Blood UA 3+ (*)      Leukocytes, UA Trace (*)     All other components within normal limits    Narrative:     Specimen Source->Urine   URINALYSIS MICROSCOPIC - Abnormal; Notable for the following components:    RBC, UA >100 (*)     WBC, UA >100 (*)     All other components within normal limits    Narrative:     Specimen Source->Urine   CULTURE, URINE   POCT URINE PREGNANCY          Imaging Results               CT Abdomen Pelvis With Contrast (Final result)  Result time 05/19/23 20:09:05      Final result by Teodoro Caceres MD (05/19/23 20:09:05)                   Impression:      Acute appendicitis with phlegmon/early abscess formation about the tip of the appendix.    Focal induration in the left lower abdominal fold.  Recommend correlation with direct visual inspection for intertrigo.    Hepatosplenomegaly and hepatic steatosis.    Bilateral lower lobe pulmonary nodules measuring up to 4 mm.  Clinical considerations will determine the role for further evaluation/follow-up as Fleischer society guidelines do not apply under age 35.    No significant abnormality to explain reported history of hematuria.    This report was flagged in Epic as abnormal.  This report was flagged in Epic as containing an incidental finding.    This critical finding was discovered/received at 19:20. The critical information above was relayed directly by Dr. Naresh Jacome by telephone to Mita Murray PA-C on 5/19/2023 at 19:55.    Electronically signed by resident: Naresh Jacome  Date:    05/19/2023  Time:    18:49    Electronically signed by: Teodoro Caceres MD  Date:    05/19/2023  Time:    20:09               Narrative:    EXAMINATION:  CT ABDOMEN PELVIS WITH CONTRAST    CLINICAL HISTORY:  Abdominal pain, acute, nonlocalized;    TECHNIQUE:  Low dose axial images, sagittal and coronal reformations were obtained from the lung bases to the pubic symphysis following the IV administration of 100 mL of Omnipaque 350 .  Oral contrast was not  administered.    COMPARISON:  Pelvic ultrasound 07/05/2016    FINDINGS:  LUNG BASES: Small bilateral low lobe pulmonary nodules, largest in the left lower lobe measuring 0.4 cm (2:8).    HEPATOBILIARY: The liver is enlarged, measuring 20.5 cm.  Hepatic parenchyma is diffusely hypoattenuating suggesting steatosis.  No focal hepatic lesions. No biliary ductal dilatation. Normal gallbladder.    SPLEEN: Mildly enlarged, measuring 14.2 cm.    PANCREAS: No focal masses or ductal dilatation.    ADRENALS: No adrenal nodules.    KIDNEYS/URETERS: No hydronephrosis, stones, or solid mass lesions.    BLADDER/PELVIC ORGANS: The uterus and bilateral ovaries are unremarkable.  No bladder wall thickening.    RETROPERITONEUM: No free air or fluid.    VESSELS: Unremarkable.    GI TRACT/PERITONEUM: No distention.  The stomach and small bowel are unremarkable.  The cecum is situated in the lower right abdomen.  The terminal ileum appears normal.  The appendix is thickened measuring up to 9 mm (best seen on coronal series 601, image 130).  There is significant surrounding fat stranding and induration about the tip of the appendix with phlegmon/developing abscess measuring 2.3 x 1.9 x 3.3 cm.  No extraluminal air or air contained within the inflammatory changes.  Prominent surrounding lymph nodes measuring up to 9 mm in short axis likely reactive.  Mild fat stranding along the adjacent sigmoid colon.    BONES AND SOFT TISSUES: No fractures or focal osseous lesions. Focal small area of skin and soft tissue induration in the left lower abdominal fold.                                       Medications   FLUoxetine capsule 20 mg (20 mg Oral Given 5/20/23 0916)   levothyroxine tablet 50 mcg (0 mcg Oral Hold 5/20/23 0600)   sodium chloride 0.9% flush 10 mL (has no administration in time range)   ondansetron injection 4 mg (has no administration in time range)   melatonin tablet 6 mg (has no administration in time range)   oxyCODONE immediate  release tablet 5 mg (has no administration in time range)   dextrose 5 % and 0.45 % NaCl with KCl 20 mEq infusion ( Intravenous New Bag 5/20/23 0034)   enoxaparin injection 40 mg (has no administration in time range)   ibuprofen tablet 600 mg (0 mg Oral Hold 5/20/23 0600)   piperacillin-tazobactam (ZOSYN) 4.5 g in dextrose 5 % in water (D5W) 5 % 100 mL IVPB (MB+) (0 g Intravenous Stopped 5/20/23 0900)   sodium chloride 0.9% bolus 1,000 mL 1,000 mL (0 mLs Intravenous Stopped 5/19/23 1802)   ketorolac injection 9.999 mg (9.999 mg Intravenous Given 5/19/23 1638)   acetaminophen tablet 650 mg (650 mg Oral Given 5/19/23 1639)   iohexoL (OMNIPAQUE 350) injection 100 mL (100 mLs Intravenous Given 5/19/23 1846)   piperacillin-tazobactam (ZOSYN) 4.5 g in dextrose 5 % in water (D5W) 5 % 100 mL IVPB (MB+) (0 g Intravenous Stopped 5/19/23 2053)     Medical Decision Making:   Initial Assessment:   28-year-old female with past medical history of RODRIGUE and hypothyroidism presents to ED with lower abdominal pain x6 days.  She has associated hematuria and fever.  She is afebrile on presentation though tachycardic.  She is nontoxic appearing. Lower abdominal tenderness that seems to be worse in LLQ noted on examination. No guarding, rebound tenderness or rigidity. No CVA tenderness noted. I will initiate workup and reassess  Differential Diagnosis:   UTI, pyelonephritis, nephrolithiasis, ovarian cyst, TOA, ectopic pregnancy, appendicitis     Less likely diverticulitis  Clinical Tests:   Lab Tests: Ordered and Reviewed  Radiological Study: Ordered and Reviewed  Medical Tests: Ordered and Reviewed  ED Management:  - CT abdomen and pelvis significant for appendicitis.  General surgery consulted and plans to admit to their service for further management.  Patient started on Zosyn while in the ED  - Urinalysis significant for 1+ leukocytes, >100 RBCs.  13 squamous cells.  Denies urinary symptoms including dysuria, urinary frequency  - No  CVA tenderness.  No leukocytosis.  Doubt pyelonephritis/UTI  - UPT negative  - On reassessment, she is resting comfortably. Reports minimal pain.   - Will admit at this time for gen surgery for further management.   Other:   I discussed test(s) with the performing physician.           ED Course as of 05/20/23 1105   Fri May 19, 2023   1544 WBC: 10.37 [HM]   1544 Preg Test, Ur: Negative [HM]   1548 29 yo F w/ diffuse abd pain, tmax 100.1 at home. Tachycardic in triage. Hematuria noted. Pregnancy test negative. [DS]   1607 WBC, UA(!): >100 [HM]   1608 RBC, UA(!): >100 [HM]   2133 Acute appendicitis with phlegmon/early abscess formation about the tip of the appendix.    [HM]      ED Course User Index  [DS] Cristian James MD  [HM] Mita Murray PA-C                   Clinical Impression:   Final diagnoses:  [R31.0] Gross hematuria (Primary)  [R10.30] Lower abdominal pain  [K37] Appendicitis, unspecified appendicitis type  [K35.30] Acute appendicitis with localized peritonitis, without perforation, abscess, or gangrene        ED Disposition Condition    Observation                 Mita Murray PA-C  05/20/23 1105

## 2023-05-19 NOTE — ED TRIAGE NOTES
Pt sts hematuria x2 days w intermittent abd pain made worse w movement- pt sts intermittent nausea and sts 1 episode of vomiting since abd pain began - sts blood in urine has not been present w every void but present intermittently. Pt sts otc meds at home have not provided permanent relief w pain. Pt denies dysuria but sts abd pain is relieved temporarily after urination - rr even/unlabored, denies CP, skin wnl - in NAD.

## 2023-05-20 ENCOUNTER — ANESTHESIA EVENT (OUTPATIENT)
Dept: SURGERY | Facility: HOSPITAL | Age: 28
End: 2023-05-20
Payer: MEDICAID

## 2023-05-20 ENCOUNTER — ANESTHESIA (OUTPATIENT)
Dept: SURGERY | Facility: HOSPITAL | Age: 28
End: 2023-05-20
Payer: MEDICAID

## 2023-05-20 PROBLEM — K35.30 ACUTE APPENDICITIS WITH LOCALIZED PERITONITIS: Status: ACTIVE | Noted: 2023-05-20

## 2023-05-20 LAB
ALBUMIN SERPL BCP-MCNC: 2.5 G/DL (ref 3.5–5.2)
ALP SERPL-CCNC: 93 U/L (ref 55–135)
ALT SERPL W/O P-5'-P-CCNC: 46 U/L (ref 10–44)
ANION GAP SERPL CALC-SCNC: 9 MMOL/L (ref 8–16)
AST SERPL-CCNC: 53 U/L (ref 10–40)
BACTERIA UR CULT: NORMAL
BACTERIA UR CULT: NORMAL
BASOPHILS # BLD AUTO: 0.04 K/UL (ref 0–0.2)
BASOPHILS NFR BLD: 0.4 % (ref 0–1.9)
BILIRUB SERPL-MCNC: 0.6 MG/DL (ref 0.1–1)
BUN SERPL-MCNC: 6 MG/DL (ref 6–20)
CALCIUM SERPL-MCNC: 9.5 MG/DL (ref 8.7–10.5)
CHLORIDE SERPL-SCNC: 105 MMOL/L (ref 95–110)
CO2 SERPL-SCNC: 22 MMOL/L (ref 23–29)
CREAT SERPL-MCNC: 0.7 MG/DL (ref 0.5–1.4)
DIFFERENTIAL METHOD: ABNORMAL
EOSINOPHIL # BLD AUTO: 0.6 K/UL (ref 0–0.5)
EOSINOPHIL NFR BLD: 6.2 % (ref 0–8)
ERYTHROCYTE [DISTWIDTH] IN BLOOD BY AUTOMATED COUNT: 12.3 % (ref 11.5–14.5)
EST. GFR  (NO RACE VARIABLE): >60 ML/MIN/1.73 M^2
GLUCOSE SERPL-MCNC: 90 MG/DL (ref 70–110)
HCT VFR BLD AUTO: 40.6 % (ref 37–48.5)
HGB BLD-MCNC: 12.9 G/DL (ref 12–16)
IMM GRANULOCYTES # BLD AUTO: 0.07 K/UL (ref 0–0.04)
IMM GRANULOCYTES NFR BLD AUTO: 0.7 % (ref 0–0.5)
LYMPHOCYTES # BLD AUTO: 1.9 K/UL (ref 1–4.8)
LYMPHOCYTES NFR BLD: 19.2 % (ref 18–48)
MAGNESIUM SERPL-MCNC: 2.3 MG/DL (ref 1.6–2.6)
MCH RBC QN AUTO: 34.6 PG (ref 27–31)
MCHC RBC AUTO-ENTMCNC: 31.8 G/DL (ref 32–36)
MCV RBC AUTO: 109 FL (ref 82–98)
MONOCYTES # BLD AUTO: 0.8 K/UL (ref 0.3–1)
MONOCYTES NFR BLD: 8.2 % (ref 4–15)
NEUTROPHILS # BLD AUTO: 6.5 K/UL (ref 1.8–7.7)
NEUTROPHILS NFR BLD: 65.3 % (ref 38–73)
NRBC BLD-RTO: 0 /100 WBC
PHOSPHATE SERPL-MCNC: 4.5 MG/DL (ref 2.7–4.5)
PLATELET # BLD AUTO: 301 K/UL (ref 150–450)
PMV BLD AUTO: 9.8 FL (ref 9.2–12.9)
POTASSIUM SERPL-SCNC: 3.5 MMOL/L (ref 3.5–5.1)
PROT SERPL-MCNC: 7.3 G/DL (ref 6–8.4)
RBC # BLD AUTO: 3.73 M/UL (ref 4–5.4)
SODIUM SERPL-SCNC: 136 MMOL/L (ref 136–145)
WBC # BLD AUTO: 9.97 K/UL (ref 3.9–12.7)

## 2023-05-20 PROCEDURE — 37000008 HC ANESTHESIA 1ST 15 MINUTES: Performed by: STUDENT IN AN ORGANIZED HEALTH CARE EDUCATION/TRAINING PROGRAM

## 2023-05-20 PROCEDURE — 00840 ANES IPER PX LOWER ABD NOS: CPT | Performed by: STUDENT IN AN ORGANIZED HEALTH CARE EDUCATION/TRAINING PROGRAM

## 2023-05-20 PROCEDURE — 85025 COMPLETE CBC W/AUTO DIFF WBC: CPT | Performed by: STUDENT IN AN ORGANIZED HEALTH CARE EDUCATION/TRAINING PROGRAM

## 2023-05-20 PROCEDURE — 63600175 PHARM REV CODE 636 W HCPCS: Performed by: NURSE ANESTHETIST, CERTIFIED REGISTERED

## 2023-05-20 PROCEDURE — 80053 COMPREHEN METABOLIC PANEL: CPT | Performed by: STUDENT IN AN ORGANIZED HEALTH CARE EDUCATION/TRAINING PROGRAM

## 2023-05-20 PROCEDURE — D9220A PRA ANESTHESIA: ICD-10-PCS | Mod: CRNA,,, | Performed by: NURSE ANESTHETIST, CERTIFIED REGISTERED

## 2023-05-20 PROCEDURE — 88304 TISSUE EXAM BY PATHOLOGIST: CPT | Mod: 26,,, | Performed by: PATHOLOGY

## 2023-05-20 PROCEDURE — 94660 CPAP INITIATION&MGMT: CPT | Mod: 59

## 2023-05-20 PROCEDURE — 63600175 PHARM REV CODE 636 W HCPCS: Performed by: ANESTHESIOLOGY

## 2023-05-20 PROCEDURE — 96372 THER/PROPH/DIAG INJ SC/IM: CPT | Performed by: STUDENT IN AN ORGANIZED HEALTH CARE EDUCATION/TRAINING PROGRAM

## 2023-05-20 PROCEDURE — 83735 ASSAY OF MAGNESIUM: CPT | Performed by: STUDENT IN AN ORGANIZED HEALTH CARE EDUCATION/TRAINING PROGRAM

## 2023-05-20 PROCEDURE — 37000009 HC ANESTHESIA EA ADD 15 MINS: Performed by: STUDENT IN AN ORGANIZED HEALTH CARE EDUCATION/TRAINING PROGRAM

## 2023-05-20 PROCEDURE — G0378 HOSPITAL OBSERVATION PER HR: HCPCS

## 2023-05-20 PROCEDURE — 36000709 HC OR TIME LEV III EA ADD 15 MIN: Performed by: STUDENT IN AN ORGANIZED HEALTH CARE EDUCATION/TRAINING PROGRAM

## 2023-05-20 PROCEDURE — 71000033 HC RECOVERY, INTIAL HOUR: Performed by: STUDENT IN AN ORGANIZED HEALTH CARE EDUCATION/TRAINING PROGRAM

## 2023-05-20 PROCEDURE — 27201423 OPTIME MED/SURG SUP & DEVICES STERILE SUPPLY: Performed by: STUDENT IN AN ORGANIZED HEALTH CARE EDUCATION/TRAINING PROGRAM

## 2023-05-20 PROCEDURE — D9220A PRA ANESTHESIA: ICD-10-PCS | Mod: ANES,,, | Performed by: ANESTHESIOLOGY

## 2023-05-20 PROCEDURE — 63600175 PHARM REV CODE 636 W HCPCS: Performed by: STUDENT IN AN ORGANIZED HEALTH CARE EDUCATION/TRAINING PROGRAM

## 2023-05-20 PROCEDURE — 44970 PR LAP,APPENDECTOMY: ICD-10-PCS | Mod: 22,,, | Performed by: STUDENT IN AN ORGANIZED HEALTH CARE EDUCATION/TRAINING PROGRAM

## 2023-05-20 PROCEDURE — 96376 TX/PRO/DX INJ SAME DRUG ADON: CPT

## 2023-05-20 PROCEDURE — 88304 PR  SURG PATH,LEVEL III: ICD-10-PCS | Mod: 26,,, | Performed by: PATHOLOGY

## 2023-05-20 PROCEDURE — 25000003 PHARM REV CODE 250: Performed by: STUDENT IN AN ORGANIZED HEALTH CARE EDUCATION/TRAINING PROGRAM

## 2023-05-20 PROCEDURE — 25000003 PHARM REV CODE 250

## 2023-05-20 PROCEDURE — 96375 TX/PRO/DX INJ NEW DRUG ADDON: CPT

## 2023-05-20 PROCEDURE — 94761 N-INVAS EAR/PLS OXIMETRY MLT: CPT

## 2023-05-20 PROCEDURE — 36000708 HC OR TIME LEV III 1ST 15 MIN: Performed by: STUDENT IN AN ORGANIZED HEALTH CARE EDUCATION/TRAINING PROGRAM

## 2023-05-20 PROCEDURE — 71000016 HC POSTOP RECOV ADDL HR: Performed by: STUDENT IN AN ORGANIZED HEALTH CARE EDUCATION/TRAINING PROGRAM

## 2023-05-20 PROCEDURE — 27000221 HC OXYGEN, UP TO 24 HOURS

## 2023-05-20 PROCEDURE — 27000190 HC CPAP FULL FACE MASK W/VALVE

## 2023-05-20 PROCEDURE — 96366 THER/PROPH/DIAG IV INF ADDON: CPT | Mod: 59

## 2023-05-20 PROCEDURE — 71000015 HC POSTOP RECOV 1ST HR: Performed by: STUDENT IN AN ORGANIZED HEALTH CARE EDUCATION/TRAINING PROGRAM

## 2023-05-20 PROCEDURE — 36415 COLL VENOUS BLD VENIPUNCTURE: CPT | Performed by: STUDENT IN AN ORGANIZED HEALTH CARE EDUCATION/TRAINING PROGRAM

## 2023-05-20 PROCEDURE — 63600175 PHARM REV CODE 636 W HCPCS

## 2023-05-20 PROCEDURE — 84100 ASSAY OF PHOSPHORUS: CPT | Performed by: STUDENT IN AN ORGANIZED HEALTH CARE EDUCATION/TRAINING PROGRAM

## 2023-05-20 PROCEDURE — 25000003 PHARM REV CODE 250: Performed by: NURSE ANESTHETIST, CERTIFIED REGISTERED

## 2023-05-20 PROCEDURE — D9220A PRA ANESTHESIA: Mod: ANES,,, | Performed by: ANESTHESIOLOGY

## 2023-05-20 PROCEDURE — 44970 LAPAROSCOPY APPENDECTOMY: CPT | Mod: 22,,, | Performed by: STUDENT IN AN ORGANIZED HEALTH CARE EDUCATION/TRAINING PROGRAM

## 2023-05-20 PROCEDURE — 99900035 HC TECH TIME PER 15 MIN (STAT)

## 2023-05-20 PROCEDURE — D9220A PRA ANESTHESIA: Mod: CRNA,,, | Performed by: NURSE ANESTHETIST, CERTIFIED REGISTERED

## 2023-05-20 PROCEDURE — 88304 TISSUE EXAM BY PATHOLOGIST: CPT | Performed by: PATHOLOGY

## 2023-05-20 RX ORDER — ONDANSETRON 2 MG/ML
INJECTION INTRAMUSCULAR; INTRAVENOUS
Status: DISCONTINUED | OUTPATIENT
Start: 2023-05-20 | End: 2023-05-20

## 2023-05-20 RX ORDER — PROCHLORPERAZINE EDISYLATE 5 MG/ML
5 INJECTION INTRAMUSCULAR; INTRAVENOUS EVERY 30 MIN PRN
Status: DISCONTINUED | OUTPATIENT
Start: 2023-05-20 | End: 2023-05-20

## 2023-05-20 RX ORDER — HYDROMORPHONE HYDROCHLORIDE 1 MG/ML
0.2 INJECTION, SOLUTION INTRAMUSCULAR; INTRAVENOUS; SUBCUTANEOUS EVERY 5 MIN PRN
Status: DISCONTINUED | OUTPATIENT
Start: 2023-05-20 | End: 2023-05-20

## 2023-05-20 RX ORDER — LABETALOL HCL 20 MG/4 ML
SYRINGE (ML) INTRAVENOUS
Status: COMPLETED
Start: 2023-05-20 | End: 2023-05-20

## 2023-05-20 RX ORDER — HALOPERIDOL 5 MG/ML
INJECTION INTRAMUSCULAR
Status: DISCONTINUED | OUTPATIENT
Start: 2023-05-20 | End: 2023-05-20

## 2023-05-20 RX ORDER — ROCURONIUM BROMIDE 10 MG/ML
INJECTION, SOLUTION INTRAVENOUS
Status: DISCONTINUED | OUTPATIENT
Start: 2023-05-20 | End: 2023-05-20

## 2023-05-20 RX ORDER — HYDROMORPHONE HYDROCHLORIDE 1 MG/ML
0.2 INJECTION, SOLUTION INTRAMUSCULAR; INTRAVENOUS; SUBCUTANEOUS EVERY 5 MIN PRN
Status: DISCONTINUED | OUTPATIENT
Start: 2023-05-20 | End: 2023-05-20 | Stop reason: HOSPADM

## 2023-05-20 RX ORDER — MIDAZOLAM HYDROCHLORIDE 1 MG/ML
INJECTION, SOLUTION INTRAMUSCULAR; INTRAVENOUS
Status: DISCONTINUED | OUTPATIENT
Start: 2023-05-20 | End: 2023-05-20

## 2023-05-20 RX ORDER — PROPOFOL 10 MG/ML
VIAL (ML) INTRAVENOUS
Status: DISCONTINUED | OUTPATIENT
Start: 2023-05-20 | End: 2023-05-20

## 2023-05-20 RX ORDER — OXYCODONE HYDROCHLORIDE 5 MG/1
5 TABLET ORAL EVERY 4 HOURS PRN
Status: DISCONTINUED | OUTPATIENT
Start: 2023-05-20 | End: 2023-05-21 | Stop reason: HOSPADM

## 2023-05-20 RX ORDER — BUPIVACAINE HYDROCHLORIDE 2.5 MG/ML
INJECTION, SOLUTION EPIDURAL; INFILTRATION; INTRACAUDAL
Status: DISCONTINUED | OUTPATIENT
Start: 2023-05-20 | End: 2023-05-20 | Stop reason: HOSPADM

## 2023-05-20 RX ORDER — OXYCODONE HYDROCHLORIDE 10 MG/1
10 TABLET ORAL EVERY 4 HOURS PRN
Status: DISCONTINUED | OUTPATIENT
Start: 2023-05-20 | End: 2023-05-21 | Stop reason: HOSPADM

## 2023-05-20 RX ORDER — DEXAMETHASONE SODIUM PHOSPHATE 4 MG/ML
INJECTION, SOLUTION INTRA-ARTICULAR; INTRALESIONAL; INTRAMUSCULAR; INTRAVENOUS; SOFT TISSUE
Status: DISCONTINUED | OUTPATIENT
Start: 2023-05-20 | End: 2023-05-20

## 2023-05-20 RX ORDER — LIDOCAINE HYDROCHLORIDE 20 MG/ML
INJECTION INTRAVENOUS
Status: DISCONTINUED | OUTPATIENT
Start: 2023-05-20 | End: 2023-05-20

## 2023-05-20 RX ORDER — FENTANYL CITRATE 50 UG/ML
INJECTION, SOLUTION INTRAMUSCULAR; INTRAVENOUS
Status: DISCONTINUED | OUTPATIENT
Start: 2023-05-20 | End: 2023-05-20

## 2023-05-20 RX ORDER — DEXMEDETOMIDINE HYDROCHLORIDE 100 UG/ML
INJECTION, SOLUTION INTRAVENOUS
Status: DISCONTINUED | OUTPATIENT
Start: 2023-05-20 | End: 2023-05-20

## 2023-05-20 RX ORDER — KETAMINE HCL IN 0.9 % NACL 50 MG/5 ML
SYRINGE (ML) INTRAVENOUS
Status: DISCONTINUED | OUTPATIENT
Start: 2023-05-20 | End: 2023-05-20

## 2023-05-20 RX ORDER — SODIUM CHLORIDE 0.9 % (FLUSH) 0.9 %
10 SYRINGE (ML) INJECTION
Status: DISCONTINUED | OUTPATIENT
Start: 2023-05-20 | End: 2023-05-20

## 2023-05-20 RX ORDER — LABETALOL HCL 20 MG/4 ML
10 SYRINGE (ML) INTRAVENOUS ONCE
Status: COMPLETED | OUTPATIENT
Start: 2023-05-20 | End: 2023-05-20

## 2023-05-20 RX ADMIN — FLUOXETINE 20 MG: 20 CAPSULE ORAL at 09:05

## 2023-05-20 RX ADMIN — PIPERACILLIN AND TAZOBACTAM 4.5 G: 4; .5 INJECTION, POWDER, LYOPHILIZED, FOR SOLUTION INTRAVENOUS; PARENTERAL at 05:05

## 2023-05-20 RX ADMIN — DEXMEDETOMIDINE HYDROCHLORIDE 10 MCG: 100 INJECTION, SOLUTION INTRAVENOUS at 12:05

## 2023-05-20 RX ADMIN — SODIUM CHLORIDE: 0.9 INJECTION, SOLUTION INTRAVENOUS at 12:05

## 2023-05-20 RX ADMIN — Medication 10 MG: at 02:05

## 2023-05-20 RX ADMIN — FENTANYL CITRATE 100 MCG: 50 INJECTION, SOLUTION INTRAMUSCULAR; INTRAVENOUS at 12:05

## 2023-05-20 RX ADMIN — SODIUM CHLORIDE, SODIUM GLUCONATE, SODIUM ACETATE, POTASSIUM CHLORIDE, MAGNESIUM CHLORIDE, SODIUM PHOSPHATE, DIBASIC, AND POTASSIUM PHOSPHATE: .53; .5; .37; .037; .03; .012; .00082 INJECTION, SOLUTION INTRAVENOUS at 01:05

## 2023-05-20 RX ADMIN — Medication 25 MG: at 12:05

## 2023-05-20 RX ADMIN — ROCURONIUM BROMIDE 10 MG: 10 INJECTION INTRAVENOUS at 01:05

## 2023-05-20 RX ADMIN — ONDANSETRON 4 MG: 2 INJECTION INTRAMUSCULAR; INTRAVENOUS at 02:05

## 2023-05-20 RX ADMIN — ROCURONIUM BROMIDE 50 MG: 10 INJECTION INTRAVENOUS at 12:05

## 2023-05-20 RX ADMIN — IBUPROFEN 600 MG: 600 TABLET, FILM COATED ORAL at 12:05

## 2023-05-20 RX ADMIN — HYDROMORPHONE HYDROCHLORIDE 0.2 MG: 1 INJECTION, SOLUTION INTRAMUSCULAR; INTRAVENOUS; SUBCUTANEOUS at 02:05

## 2023-05-20 RX ADMIN — OXYCODONE HYDROCHLORIDE 10 MG: 10 TABLET ORAL at 05:05

## 2023-05-20 RX ADMIN — PROPOFOL 200 MG: 10 INJECTION, EMULSION INTRAVENOUS at 12:05

## 2023-05-20 RX ADMIN — HALOPERIDOL LACTATE 1 MG: 5 INJECTION, SOLUTION INTRAMUSCULAR at 12:05

## 2023-05-20 RX ADMIN — ONDANSETRON 4 MG: 2 INJECTION INTRAMUSCULAR; INTRAVENOUS at 01:05

## 2023-05-20 RX ADMIN — LABETALOL HYDROCHLORIDE 10 MG: 5 INJECTION, SOLUTION INTRAVENOUS at 02:05

## 2023-05-20 RX ADMIN — OXYCODONE HYDROCHLORIDE 5 MG: 5 TABLET ORAL at 02:05

## 2023-05-20 RX ADMIN — LIDOCAINE HYDROCHLORIDE 100 MG: 20 INJECTION INTRAVENOUS at 12:05

## 2023-05-20 RX ADMIN — DEXAMETHASONE SODIUM PHOSPHATE 8 MG: 4 INJECTION, SOLUTION INTRAMUSCULAR; INTRAVENOUS at 12:05

## 2023-05-20 RX ADMIN — ENOXAPARIN SODIUM 40 MG: 40 INJECTION SUBCUTANEOUS at 05:05

## 2023-05-20 RX ADMIN — DEXTROSE, SODIUM CHLORIDE, AND POTASSIUM CHLORIDE: 5; .45; .15 INJECTION INTRAVENOUS at 12:05

## 2023-05-20 RX ADMIN — PIPERACILLIN AND TAZOBACTAM 4.5 G: 4; .5 INJECTION, POWDER, LYOPHILIZED, FOR SOLUTION INTRAVENOUS; PARENTERAL at 11:05

## 2023-05-20 RX ADMIN — SUGAMMADEX 200 MG: 100 INJECTION, SOLUTION INTRAVENOUS at 01:05

## 2023-05-20 RX ADMIN — FENTANYL CITRATE 50 MCG: 50 INJECTION, SOLUTION INTRAMUSCULAR; INTRAVENOUS at 01:05

## 2023-05-20 RX ADMIN — PIPERACILLIN AND TAZOBACTAM 4.5 G: 4; .5 INJECTION, POWDER, LYOPHILIZED, FOR SOLUTION INTRAVENOUS; PARENTERAL at 09:05

## 2023-05-20 RX ADMIN — FENTANYL CITRATE 50 MCG: 50 INJECTION, SOLUTION INTRAMUSCULAR; INTRAVENOUS at 12:05

## 2023-05-20 RX ADMIN — IBUPROFEN 600 MG: 600 TABLET, FILM COATED ORAL at 05:05

## 2023-05-20 RX ADMIN — OXYCODONE HYDROCHLORIDE 10 MG: 10 TABLET ORAL at 11:05

## 2023-05-20 RX ADMIN — MIDAZOLAM HYDROCHLORIDE 2 MG: 1 INJECTION, SOLUTION INTRAMUSCULAR; INTRAVENOUS at 12:05

## 2023-05-20 NOTE — NURSING
[facility] Plan of Care Note  Dx appendicitis    Shift Events none    Goals of Care: pain control    Neuro: AOx4    Vital Signs: WNL    Respiratory: WNL    Diet: NPO    Is patient tolerating current diet? NPO    GTTS: D5 0.45 20 meq Kcl @ 125    Urine Output/Bowel Movement: UOP hematuria; no BM    Drains/Tubes/Tube Feeds (include total output/shift): none    Lines: Left arm      Accuchecks:none    Skin: papules all over back; chest    Fall Risk Score: 0    Activity level? independent    Any scheduled procedures? appendectomy    Any safety concerns? none    Other: none

## 2023-05-20 NOTE — CONSULTS
[facility]  General Surgery  Consult Note    Patient Name: Sho Castaneda  MRN: [id number]  Code Status: Full Code  Admission Date: 5/19/2023  Hospital Length of Stay: 0 days  Attending Physician: Rey Zaldivar MD  Primary Care Provider: Aydee Dexter NP    Patient information was obtained from patient, parent, past medical records and ER records.     Inpatient consult to General surgery  Consult performed by: Sho Chahal MD  Consult ordered by: Mita Murray PA-C        Subjective:     Principal Problem: Acute appendicitis with localized peritonitis    History of Present Illness: Sho Castaneda is a 28 y.o. female with a history of obesity (BMI 47.1), hypothyroidism, ADD, ADHD, and sleep apnea who presents with lower abdominal/pelvic pain which started on Monday. She reports having severe pain on Monday which started without any inciting event. This kept her from being able to sleep. She took some pain medicine and was eventually able to fall asleep for a few hours. When she woke up her pain was improved but still present. Since then she has had persistent pain which has prohibited her from being able to work. Tylenol has not helped with the pain but she has had some relief with advil. Has had associated anorexia and bloating. Also says she has had hematuria. She says this could be her period but would mean she is a week early. Denies dysuria or flank pain. Is having some suprapubic pain but overall is having bilateral lower quadrant pain    Upon arrival she was AF and HDS. Labs with normal WBC and without granulocytosis. UA with RBC and leukocytes but also with squamous epithelium. CT showed a thickened appendix with a 2.3 x 1.9 x 3.3 cm phlegmon vs developing abscess near the tip of the appendix with findings overall concerning for acute appendicitis. Surgery consulted for evaluation.       No current facility-administered medications on file prior to encounter.     Current Outpatient  Medications on File Prior to Encounter   Medication Sig    dextroamphetamine-amphetamine 30 mg Tab TK 1 T PO  EVERY EVENING    FLUoxetine 20 MG capsule Take 20 mg by mouth. 1 Capsule Oral Every day    levothyroxine (SYNTHROID) 50 MCG tablet Take 1 tablet (50 mcg total) by mouth before breakfast.    VYVANSE 60 mg capsule Take 60 mg by mouth every morning.    dexAMETHasone (DECADRON) 1 MG Tab Take 1mg by mouth at 11 PM the night before 8 AM labs are drawn       Review of patient's allergies indicates:   Allergen Reactions    Benzoyl peroxide Other (See Comments)       Past Medical History:   Diagnosis Date    ADD (attention deficit disorder)     ADHD (attention deficit hyperactivity disorder)     Hypothyroidism     Mental disorder     Sleep apnea      Past Surgical History:   Procedure Laterality Date    PILONIDAL CYST DRAINAGE  2014    removed hair follicale on tailbone    TONSILLECTOMY       Family History       Problem Relation (Age of Onset)    Breast cancer Maternal Grandmother    Depression Mother, Father, Sister, Brother    Hypertension Father    Sleep apnea Father    Thyroid disease Mother    Thyroid nodules Mother          Tobacco Use    Smoking status: Every Day     Packs/day: 0.50     Years: 12.00     Pack years: 6.00     Types: Cigarettes     Start date: 7/9/2013    Smokeless tobacco: Never   Substance and Sexual Activity    Alcohol use: Yes     Alcohol/week: 0.0 standard drinks     Comment: occasionally    Drug use: No    Sexual activity: Not Currently     Review of Systems   Constitutional:  Positive for activity change and appetite change. Negative for chills, diaphoresis, fever and unexpected weight change.   HENT:  Negative for sinus pressure and sore throat.    Eyes:  Negative for photophobia and visual disturbance.   Respiratory:  Negative for cough and shortness of breath.    Cardiovascular:  Negative for chest pain, palpitations and leg swelling.   Gastrointestinal:  Positive for  abdominal pain. Negative for abdominal distention, blood in stool, diarrhea, nausea and vomiting.   Genitourinary:  Positive for hematuria. Negative for dysuria and flank pain.   Musculoskeletal:  Negative for arthralgias and myalgias.   Skin:  Negative for rash and wound.   Neurological:  Negative for syncope and headaches.   Psychiatric/Behavioral:  Negative for confusion and hallucinations.    Objective:     Vital Signs (Most Recent):  Temp: 98.3 °F (36.8 °C) (05/20/23 0200)  Pulse: 100 (05/20/23 0200)  Resp: 16 (05/20/23 0200)  BP: (!) 152/96 (05/20/23 0200)  SpO2: (!) 93 % (05/20/23 0200) Vital Signs (24h Range):  Temp:  [98.3 °F (36.8 °C)-99.3 °F (37.4 °C)] 98.3 °F (36.8 °C)  Pulse:  [] 100  Resp:  [16-20] 16  SpO2:  [93 %-100 %] 93 %  BP: (131-152)/(75-96) 152/96     Weight: (!) 140.6 kg (310 lb)  Body mass index is 45.78 kg/m².     Physical Exam       I have reviewed all pertinent lab results within the past 24 hours.  CBC:   Recent Labs   Lab 05/19/23  1525   WBC 10.37   RBC 3.97*   HGB 13.8   HCT 42.1      *   MCH 34.8*   MCHC 32.8     CMP:   Recent Labs   Lab 05/19/23  1525   GLU 85   CALCIUM 9.6   ALBUMIN 2.7*   PROT 8.4      K 3.8   CO2 26      BUN 6   CREATININE 0.7   ALKPHOS 105   ALT 52*   AST 70*   BILITOT 0.8       Significant Diagnostics:  I have reviewed all pertinent imaging results/findings within the past 24 hours.    Imaging Results               CT Abdomen Pelvis With Contrast (Final result)  Result time 05/19/23 20:09:05      Final result by Teodoro Caceres MD (05/19/23 20:09:05)                   Impression:      Acute appendicitis with phlegmon/early abscess formation about the tip of the appendix.    Focal induration in the left lower abdominal fold.  Recommend correlation with direct visual inspection for intertrigo.    Hepatosplenomegaly and hepatic steatosis.    Bilateral lower lobe pulmonary nodules measuring up to 4 mm.  Clinical considerations will  determine the role for further evaluation/follow-up as Fleischer society guidelines do not apply under age 35.    No significant abnormality to explain reported history of hematuria.    This report was flagged in Epic as abnormal.  This report was flagged in Epic as containing an incidental finding.    This critical finding was discovered/received at 19:20. The critical information above was relayed directly by Dr. Naresh Jacome by telephone to Mita Murray PA-C on 5/19/2023 at 19:55.    Electronically signed by resident: Naresh Jacome  Date:    05/19/2023  Time:    18:49    Electronically signed by: Teodoro Caceres MD  Date:    05/19/2023  Time:    20:09               Narrative:    EXAMINATION:  CT ABDOMEN PELVIS WITH CONTRAST    CLINICAL HISTORY:  Abdominal pain, acute, nonlocalized;    TECHNIQUE:  Low dose axial images, sagittal and coronal reformations were obtained from the lung bases to the pubic symphysis following the IV administration of 100 mL of Omnipaque 350 .  Oral contrast was not administered.    COMPARISON:  Pelvic ultrasound 07/05/2016    FINDINGS:  LUNG BASES: Small bilateral low lobe pulmonary nodules, largest in the left lower lobe measuring 0.4 cm (2:8).    HEPATOBILIARY: The liver is enlarged, measuring 20.5 cm.  Hepatic parenchyma is diffusely hypoattenuating suggesting steatosis.  No focal hepatic lesions. No biliary ductal dilatation. Normal gallbladder.    SPLEEN: Mildly enlarged, measuring 14.2 cm.    PANCREAS: No focal masses or ductal dilatation.    ADRENALS: No adrenal nodules.    KIDNEYS/URETERS: No hydronephrosis, stones, or solid mass lesions.    BLADDER/PELVIC ORGANS: The uterus and bilateral ovaries are unremarkable.  No bladder wall thickening.    RETROPERITONEUM: No free air or fluid.    VESSELS: Unremarkable.    GI TRACT/PERITONEUM: No distention.  The stomach and small bowel are unremarkable.  The cecum is situated in the lower right abdomen.  The terminal ileum appears  normal.  The appendix is thickened measuring up to 9 mm (best seen on coronal series 601, image 130).  There is significant surrounding fat stranding and induration about the tip of the appendix with phlegmon/developing abscess measuring 2.3 x 1.9 x 3.3 cm.  No extraluminal air or air contained within the inflammatory changes.  Prominent surrounding lymph nodes measuring up to 9 mm in short axis likely reactive.  Mild fat stranding along the adjacent sigmoid colon.    BONES AND SOFT TISSUES: No fractures or focal osseous lesions. Focal small area of skin and soft tissue induration in the left lower abdominal fold.                                         Assessment/Plan:     * Acute appendicitis with localized peritonitis  Sho Castaneda is a 28 y.o. female with acute appendicitis and possible abscess formation.     - Patient seen and examined. Labs and imaging reviewed.   - Admit to obs under general surgery  - Plan for lap appy tomorrow. Consent obtained  - CLD until 2 AM then NPO  - MM pain meds  - Zosyn  - Home meds  - PRN nausea meds      VTE Risk Mitigation (From admission, onward)         Ordered     enoxaparin injection 40 mg  Daily         05/19/23 2306     IP VTE HIGH RISK PATIENT  Once         05/19/23 2306     Place sequential compression device  Until discontinued         05/19/23 2306                Thank you for your consult. I will follow-up with patient. Please contact us if you have any additional questions.    Sho Chahal MD  General Surgery  [facility]

## 2023-05-20 NOTE — NURSING
Patient received from ED. Report via phone from Chasidy LOWE. Patient alert and oriented x4. Patient follows all commands appropriately. Patient verbalizes 1/10 abdominal pain. Left arm PIV infusing, see MAR. Skin assessment complete. Patient educated on NPO @ 0200. Patient verbalizes understanding. All needs met. Will continue to monitor.

## 2023-05-20 NOTE — SUBJECTIVE & OBJECTIVE
No current facility-administered medications on file prior to encounter.     Current Outpatient Medications on File Prior to Encounter   Medication Sig    dextroamphetamine-amphetamine 30 mg Tab TK 1 T PO  EVERY EVENING    FLUoxetine 20 MG capsule Take 20 mg by mouth. 1 Capsule Oral Every day    levothyroxine (SYNTHROID) 50 MCG tablet Take 1 tablet (50 mcg total) by mouth before breakfast.    VYVANSE 60 mg capsule Take 60 mg by mouth every morning.    dexAMETHasone (DECADRON) 1 MG Tab Take 1mg by mouth at 11 PM the night before 8 AM labs are drawn       Review of patient's allergies indicates:   Allergen Reactions    Benzoyl peroxide Other (See Comments)       Past Medical History:   Diagnosis Date    ADD (attention deficit disorder)     ADHD (attention deficit hyperactivity disorder)     Hypothyroidism     Mental disorder     Sleep apnea      Past Surgical History:   Procedure Laterality Date    PILONIDAL CYST DRAINAGE  2014    removed hair follicale on tailbone    TONSILLECTOMY       Family History       Problem Relation (Age of Onset)    Breast cancer Maternal Grandmother    Depression Mother, Father, Sister, Brother    Hypertension Father    Sleep apnea Father    Thyroid disease Mother    Thyroid nodules Mother          Tobacco Use    Smoking status: Every Day     Packs/day: 0.50     Years: 12.00     Pack years: 6.00     Types: Cigarettes     Start date: 7/9/2013    Smokeless tobacco: Never   Substance and Sexual Activity    Alcohol use: Yes     Alcohol/week: 0.0 standard drinks     Comment: occasionally    Drug use: No    Sexual activity: Not Currently     Review of Systems   Constitutional:  Positive for activity change and appetite change. Negative for chills, diaphoresis, fever and unexpected weight change.   HENT:  Negative for sinus pressure and sore throat.    Eyes:  Negative for photophobia and visual disturbance.   Respiratory:  Negative for cough and shortness of breath.    Cardiovascular:  Negative  for chest pain, palpitations and leg swelling.   Gastrointestinal:  Positive for abdominal pain. Negative for abdominal distention, blood in stool, diarrhea, nausea and vomiting.   Genitourinary:  Positive for hematuria. Negative for dysuria and flank pain.   Musculoskeletal:  Negative for arthralgias and myalgias.   Skin:  Negative for rash and wound.   Neurological:  Negative for syncope and headaches.   Psychiatric/Behavioral:  Negative for confusion and hallucinations.    Objective:     Vital Signs (Most Recent):  Temp: 98.3 °F (36.8 °C) (05/20/23 0200)  Pulse: 100 (05/20/23 0200)  Resp: 16 (05/20/23 0200)  BP: (!) 152/96 (05/20/23 0200)  SpO2: (!) 93 % (05/20/23 0200) Vital Signs (24h Range):  Temp:  [98.3 °F (36.8 °C)-99.3 °F (37.4 °C)] 98.3 °F (36.8 °C)  Pulse:  [] 100  Resp:  [16-20] 16  SpO2:  [93 %-100 %] 93 %  BP: (131-152)/(75-96) 152/96     Weight: (!) 140.6 kg (310 lb)  Body mass index is 45.78 kg/m².     Physical Exam       I have reviewed all pertinent lab results within the past 24 hours.  CBC:   Recent Labs   Lab 05/19/23  1525   WBC 10.37   RBC 3.97*   HGB 13.8   HCT 42.1      *   MCH 34.8*   MCHC 32.8     CMP:   Recent Labs   Lab 05/19/23  1525   GLU 85   CALCIUM 9.6   ALBUMIN 2.7*   PROT 8.4      K 3.8   CO2 26      BUN 6   CREATININE 0.7   ALKPHOS 105   ALT 52*   AST 70*   BILITOT 0.8       Significant Diagnostics:  I have reviewed all pertinent imaging results/findings within the past 24 hours.    Imaging Results               CT Abdomen Pelvis With Contrast (Final result)  Result time 05/19/23 20:09:05      Final result by Teodoro Caceres MD (05/19/23 20:09:05)                   Impression:      Acute appendicitis with phlegmon/early abscess formation about the tip of the appendix.    Focal induration in the left lower abdominal fold.  Recommend correlation with direct visual inspection for intertrigo.    Hepatosplenomegaly and hepatic steatosis.    Bilateral  lower lobe pulmonary nodules measuring up to 4 mm.  Clinical considerations will determine the role for further evaluation/follow-up as Fleischer society guidelines do not apply under age 35.    No significant abnormality to explain reported history of hematuria.    This report was flagged in Epic as abnormal.  This report was flagged in Epic as containing an incidental finding.    This critical finding was discovered/received at 19:20. The critical information above was relayed directly by Dr. Naresh Jacome by telephone to Mita Murray PA-C on 5/19/2023 at 19:55.    Electronically signed by resident: Naresh Jacome  Date:    05/19/2023  Time:    18:49    Electronically signed by: Teodoro Caceres MD  Date:    05/19/2023  Time:    20:09               Narrative:    EXAMINATION:  CT ABDOMEN PELVIS WITH CONTRAST    CLINICAL HISTORY:  Abdominal pain, acute, nonlocalized;    TECHNIQUE:  Low dose axial images, sagittal and coronal reformations were obtained from the lung bases to the pubic symphysis following the IV administration of 100 mL of Omnipaque 350 .  Oral contrast was not administered.    COMPARISON:  Pelvic ultrasound 07/05/2016    FINDINGS:  LUNG BASES: Small bilateral low lobe pulmonary nodules, largest in the left lower lobe measuring 0.4 cm (2:8).    HEPATOBILIARY: The liver is enlarged, measuring 20.5 cm.  Hepatic parenchyma is diffusely hypoattenuating suggesting steatosis.  No focal hepatic lesions. No biliary ductal dilatation. Normal gallbladder.    SPLEEN: Mildly enlarged, measuring 14.2 cm.    PANCREAS: No focal masses or ductal dilatation.    ADRENALS: No adrenal nodules.    KIDNEYS/URETERS: No hydronephrosis, stones, or solid mass lesions.    BLADDER/PELVIC ORGANS: The uterus and bilateral ovaries are unremarkable.  No bladder wall thickening.    RETROPERITONEUM: No free air or fluid.    VESSELS: Unremarkable.    GI TRACT/PERITONEUM: No distention.  The stomach and small bowel are unremarkable.   The cecum is situated in the lower right abdomen.  The terminal ileum appears normal.  The appendix is thickened measuring up to 9 mm (best seen on coronal series 601, image 130).  There is significant surrounding fat stranding and induration about the tip of the appendix with phlegmon/developing abscess measuring 2.3 x 1.9 x 3.3 cm.  No extraluminal air or air contained within the inflammatory changes.  Prominent surrounding lymph nodes measuring up to 9 mm in short axis likely reactive.  Mild fat stranding along the adjacent sigmoid colon.    BONES AND SOFT TISSUES: No fractures or focal osseous lesions. Focal small area of skin and soft tissue induration in the left lower abdominal fold.

## 2023-05-20 NOTE — ANESTHESIA PREPROCEDURE EVALUATION
[facility]  Anesthesia Pre-Operative Evaluation         Patient Name: Sho Castaneda  YOB: 1995  MRN: [id number]    SUBJECTIVE:     Pre-operative evaluation for Procedure(s) (LRB):  APPENDECTOMY, LAPAROSCOPIC (N/A)     05/20/2023    Sho Castaneda is a 28 y.o. female w/ a significant PMHx of morbid obesity 310 lbs, RODRIGUE, and hypothyroidism presented to ED with abdominal pain and nausea vomiting. Found to have appendicitis.      Denies active nausea      LDA:        Peripheral IV - Single Lumen 05/19/23 1528 20 G Left Antecubital (Active)         Patient Active Problem List   Diagnosis    Hashimoto's disease    Hypothyroidism    Depression    Attention and concentration deficit    RODRIGUE on CPAP    Severe obesity (BMI >= 40)       Review of patient's allergies indicates:   Allergen Reactions    Benzoyl peroxide Other (See Comments)       Current Inpatient Medications:    enoxparin  40 mg Subcutaneous Daily    FLUoxetine  20 mg Oral Daily    ibuprofen  600 mg Oral Q6H    levothyroxine  50 mcg Oral Before breakfast    piperacillin-tazobactam (ZOSYN) IVPB  4.5 g Intravenous Q8H       Current Outpatient Medications   Medication Instructions    dexAMETHasone (DECADRON) 1 MG Tab Take 1mg by mouth at 11 PM the night before 8 AM labs are drawn    dextroamphetamine-amphetamine 30 mg Tab TK 1 T PO  EVERY EVENING    FLUoxetine 20 mg    levothyroxine (SYNTHROID) 50 mcg, Oral, Before breakfast    VYVANSE 60 mg, Oral, Every morning       Surgical History  Past Surgical History:   Procedure Laterality Date    PILONIDAL CYST DRAINAGE  2014    removed hair follicale on tailbone    TONSILLECTOMY         Social History:  Tobacco Use: High Risk    Smoking Tobacco Use: Every Day    Smokeless Tobacco Use: Never    Passive Exposure: Not on file     Alcohol Use: Heavy Drinker    Frequency of Alcohol Consumption: 4 or more times a week    Average Number of Drinks: 3 or 4     Frequency of Binge Drinking: Weekly         OBJECTIVE:     Vital Signs Range (Last 24H):  Temp:  [36.8 °C (98.3 °F)-37.4 °C (99.3 °F)]   Pulse:  []   Resp:  [16-20]   BP: (131-152)/(75-96)   SpO2:  [93 %-100 %]       Significant Labs:  Lab Results   Component Value Date    WBC 10.37 05/19/2023    HGB 13.8 05/19/2023    HCT 42.1 05/19/2023     05/19/2023       Lab Results   Component Value Date     05/19/2023    K 3.8 05/19/2023     05/19/2023    BUN 6 05/19/2023    CO2 26 05/19/2023       Lab Results   Component Value Date    TSH 4.778 (H) 04/13/2023       ASSESSMENT/PLAN:         Pre-op Assessment    I have reviewed the Patient Summary Reports.     I have reviewed the Nursing Notes. I have reviewed the NPO Status.   I have reviewed the Medications.     Review of Systems      Physical Exam  General: Well nourished, Cooperative, Alert and Oriented    Airway:  Mallampati: II   Neck ROM: Normal ROM    Dental:  Intact        Anesthesia Plan  Type of Anesthesia, risks & benefits discussed:    Anesthesia Type: Gen ETT  Intra-op Monitoring Plan: Standard ASA Monitors  Post Op Pain Control Plan: multimodal analgesia and IV/PO Opioids PRN  Induction:  IV and rapid sequence  Airway Plan: Video and Direct  Informed Consent: Informed consent signed with the Patient and all parties understand the risks and agree with anesthesia plan.  All questions answered.   ASA Score: 3  Day of Surgery Review of History & Physical: H&P Update referred to the surgeon/provider.    Ready For Surgery From Anesthesia Perspective.     .

## 2023-05-20 NOTE — ASSESSMENT & PLAN NOTE
Sho Castaneda is a 28 y.o. female with acute appendicitis and possible abscess formation.     - Patient seen and examined. Labs and imaging reviewed.   - Admit to obs under general surgery  - Plan for lap appy tomorrow. Consent obtained  - CLD until 2 AM then NPO  - MM pain meds  - Zosyn  - Home meds  - PRN nausea meds

## 2023-05-20 NOTE — ANESTHESIA PROCEDURE NOTES
Intubation    Date/Time: 5/20/2023 12:17 PM  Performed by: Artie Conner CRNA  Authorized by: DEISI Cadena MD     Intubation:     Induction:  Intravenous    Intubated:  Postinduction    Mask Ventilation:  Easy mask    Attempts:  1    Attempted By:  CRNA    Method of Intubation:  Video laryngoscopy    Blade:  Colindres 3    Laryngeal View Grade comment:  Good view via Colindres    Difficult Airway Encountered?: No      Complications:  None    Airway Device:  Oral endotracheal tube    Airway Device Size:  7.5    Style/Cuff Inflation:  Cuffed    Inflation Amount (mL):  6    Tube secured:  22    Secured at:  The lips    Placement Verified By:  Capnometry    Complicating Factors:  Obesity, short neck, small mouth and poor neck/head extension    Findings Post-Intubation:  BS equal bilateral and atraumatic/condition of teeth unchanged

## 2023-05-20 NOTE — ANESTHESIA POSTPROCEDURE EVALUATION
Anesthesia Post Evaluation    Patient: Sho Castaneda    Procedure(s) Performed: Procedure(s) (LRB):  APPENDECTOMY, LAPAROSCOPIC (N/A)    Final Anesthesia Type: general      Patient location during evaluation: PACU  Patient participation: Yes- Able to Participate  Level of consciousness: awake and alert  Post-procedure vital signs: reviewed and stable  Pain management: adequate  Airway patency: patent    PONV status at discharge: No PONV  Anesthetic complications: no      Cardiovascular status: blood pressure returned to baseline  Respiratory status: unassisted, spontaneous ventilation and room air  Hydration status: euvolemic            Vitals Value Taken Time   /87 05/20/23 1432   Temp 36.8 °C (98.2 °F) 05/20/23 1406   Pulse 88 05/20/23 1439   Resp 13 05/20/23 1439   SpO2 97 % 05/20/23 1439   Vitals shown include unvalidated device data.      Event Time   Out of Recovery 05/20/2023 14:30:00         Pain/Geovanna Score: Pain Rating Prior to Med Admin: 7 (5/20/2023  2:35 PM)  Pain Rating Post Med Admin: 0 (5/20/2023  1:24 AM)  Geovanna Score: 9 (5/20/2023  2:30 PM)

## 2023-05-20 NOTE — SUBJECTIVE & OBJECTIVE
Interval History: NAEON. HDS. Pain improved this morning. To OR this afternoon.     Medications:  Continuous Infusions:   dextrose 5 % and 0.45 % NaCl with KCl 20 mEq 125 mL/hr at 05/20/23 0034     Scheduled Meds:   enoxparin  40 mg Subcutaneous Daily    FLUoxetine  20 mg Oral Daily    ibuprofen  600 mg Oral Q6H    levothyroxine  50 mcg Oral Before breakfast    piperacillin-tazobactam (ZOSYN) IVPB  4.5 g Intravenous Q8H     PRN Meds:melatonin, ondansetron, oxyCODONE, sodium chloride 0.9%     Review of patient's allergies indicates:   Allergen Reactions    Benzoyl peroxide Other (See Comments)     Objective:     Vital Signs (Most Recent):  Temp: 98.6 °F (37 °C) (05/20/23 0915)  Pulse: 93 (05/20/23 0915)  Resp: 18 (05/20/23 0915)  BP: (!) 144/77 (05/20/23 0915)  SpO2: 96 % (05/20/23 0915) Vital Signs (24h Range):  Temp:  [98.3 °F (36.8 °C)-99.3 °F (37.4 °C)] 98.6 °F (37 °C)  Pulse:  [] 93  Resp:  [16-20] 18  SpO2:  [93 %-100 %] 96 %  BP: (131-152)/(75-96) 144/77     Weight: (!) 140.6 kg (310 lb)  Body mass index is 45.78 kg/m².    Intake/Output - Last 3 Shifts         05/18 0700  05/19 0659 05/19 0700 05/20 0659 05/20 0700  05/21 0659    I.V. (mL/kg)  476.4 (3.4)     IV Piggyback  100     Total Intake(mL/kg)  576.4 (4.1)     Urine (mL/kg/hr)  100     Total Output  100     Net  +476.4                     Physical Exam  Vitals and nursing note reviewed.   Constitutional:       Appearance: Normal appearance. She is obese.   HENT:      Head: Normocephalic and atraumatic.   Eyes:      Conjunctiva/sclera: Conjunctivae normal.      Pupils: Pupils are equal, round, and reactive to light.   Cardiovascular:      Rate and Rhythm: Normal rate and regular rhythm.      Pulses: Normal pulses.   Pulmonary:      Effort: Pulmonary effort is normal.      Breath sounds: Normal breath sounds.   Abdominal:      General: Abdomen is flat. Bowel sounds are normal.      Palpations: Abdomen is soft.      Tenderness: There is abdominal  tenderness.   Musculoskeletal:         General: Normal range of motion.   Skin:     General: Skin is warm and dry.   Neurological:      General: No focal deficit present.      Mental Status: She is alert and oriented to person, place, and time.   Psychiatric:         Behavior: Behavior normal.         Judgment: Judgment normal.        Significant Labs:  I have reviewed all pertinent lab results within the past 24 hours.  CBC:   Recent Labs   Lab 05/20/23  0606   WBC 9.97   RBC 3.73*   HGB 12.9   HCT 40.6      *   MCH 34.6*   MCHC 31.8*     CMP:   Recent Labs   Lab 05/20/23  0606   GLU 90   CALCIUM 9.5   ALBUMIN 2.5*   PROT 7.3      K 3.5   CO2 22*      BUN 6   CREATININE 0.7   ALKPHOS 93   ALT 46*   AST 53*   BILITOT 0.6       Significant Diagnostics:  I have reviewed all pertinent imaging results/findings within the past 24 hours.

## 2023-05-20 NOTE — NURSING TRANSFER
Nursing Transfer Note      5/20/2023     Reason patient is being transferred: post procedure     Transfer To: 1022    Transfer via bed    Transfer with 2L NC O2    Transported by transport     Medicines sent: none     Any special needs or follow-up needed: routine     Chart send with patient: Yes    Notified: mother     Patient reassessed at: 1515 on 5/20

## 2023-05-20 NOTE — HPI
Sho Castaneda is a 28 y.o. female with a history of obesity (BMI 47.1), hypothyroidism, ADD, ADHD, and sleep apnea who presents with lower abdominal/pelvic pain which started on Monday. She reports having severe pain on Monday which started without any inciting event. This kept her from being able to sleep. She took some pain medicine and was eventually able to fall asleep for a few hours. When she woke up her pain was improved but still present. Since then she has had persistent pain which has prohibited her from being able to work. Tylenol has not helped with the pain but she has had some relief with advil. Has had associated anorexia and bloating. Also says she has had hematuria. She says this could be her period but would mean she is a week early. Denies dysuria or flank pain. Is having some suprapubic pain but overall is having bilateral lower quadrant pain    Upon arrival she was AF and HDS. Labs with normal WBC and without granulocytosis. UA with RBC and leukocytes but also with squamous epithelium. CT showed a thickened appendix with a 2.3 x 1.9 x 3.3 cm phlegmon vs developing abscess near the tip of the appendix with findings overall concerning for acute appendicitis. Surgery consulted for evaluation.

## 2023-05-20 NOTE — H&P
Please see consult note dated 5/19/23 for detailed H&P.    Sho Chahal MD  General Surgery, PGY-4  [phone]

## 2023-05-20 NOTE — ASSESSMENT & PLAN NOTE
Sho Castaneda is a 28 y.o. female with acute appendicitis and possible abscess formation.     - Patient seen and examined. Labs and imaging reviewed.   - lap appy today  - NPO, diet after surgery   - MM pain meds  - Zosyn  - Home meds  - PRN nausea meds

## 2023-05-20 NOTE — BRIEF OP NOTE
[facility] - Surgery ([facility])  Brief Operative Note    SUMMARY     Surgery Date: 5/20/2023     Surgeon(s) and Role:     * Luis Perez MD - Primary     * Cristal Dixon MD    Assisting Surgeon: None    Pre-op Diagnosis:  Appendicitis, unspecified appendicitis type [K37]    Post-op Diagnosis:  Post-Op Diagnosis Codes:     * Appendicitis, unspecified appendicitis type [K37]    Procedure(s) (LRB):  APPENDECTOMY, LAPAROSCOPIC (N/A)    Anesthesia: Choice    Operative Findings: Perforated appendicitis. 19Fr nancy drain in RLQ    Estimated Blood Loss: < 50 ml    Estimated Blood Loss has been documented.         Specimens:   Specimen (24h ago, onward)       Start     Ordered    05/20/23 1308  Specimen to Pathology, Surgery General Surgery  Once        Comments: Pre-op Diagnosis: Appendicitis, unspecified appendicitis type [K37]Procedure(s):APPENDECTOMY, LAPAROSCOPIC Number of specimens: 1Name of specimens: 1. Appendix -permanent     References:    Click here for ordering Quick Tip   Question Answer Comment   Procedure Type: General Surgery    Which provider would you like to cc? LUIS PEREZ    Release to patient Immediate        05/20/23 1340                    [id number]

## 2023-05-20 NOTE — PLAN OF CARE
[facility] - [facility]  Discharge Assessment    Primary Care Provider: Aydee Dexter NP     Discharge Assessment (most recent)       BRIEF DISCHARGE ASSESSMENT - 05/20/23 1248          Discharge Planning    Assessment Type Discharge Planning Brief Assessment                   CM to patient's room for discharge assessment. Patient off the unit having a procedure. Will follow up.      Zuleyma Mathis RN  Weekend  - [facility]  Spectralink: [phone]

## 2023-05-20 NOTE — ADDENDUM NOTE
Addendum  created 05/20/23 1459 by Donald Angulo, DO    Order list changed, Pharmacy for encounter modified

## 2023-05-20 NOTE — TRANSFER OF CARE
"Anesthesia Transfer of Care Note    Patient: Sho Castaneda    Procedure(s) Performed: Procedure(s) (LRB):  APPENDECTOMY, LAPAROSCOPIC (N/A)    Patient location: PACU    Anesthesia Type: general    Transport from OR: Transported from OR on 6-10 L/min O2 by face mask with adequate spontaneous ventilation    Post pain: adequate analgesia    Post assessment: no apparent anesthetic complications and tolerated procedure well    Post vital signs: stable    Level of consciousness: awake, alert and oriented    Nausea/Vomiting: no nausea/vomiting    Complications: none    Transfer of care protocol was followed      Last vitals:   Visit Vitals  BP (!) 175/97 (BP Location: Right arm, Patient Position: Lying)   Pulse 100   Temp 36.8 °C (98.2 °F) (Temporal)   Resp 18   Ht 5' 9" (1.753 m)   Wt (!) 140.6 kg (310 lb)   LMP 04/26/2023 (Within Days)   SpO2 96%   Breastfeeding No   BMI 45.78 kg/m²     "

## 2023-05-20 NOTE — NURSING
Nurses Note -- 4 Eyes      5/20/2023   0200 AM      Skin assessed during: Admit      [] No Altered Skin Integrity Present    []Prevention Measures Documented      [x] Yes- Altered Skin Integrity Present or Discovered   [x] LDA Added if Not in Epic (Describe Wound)   [] New Altered Skin Integrity was Present on Admit and Documented in LDA   [] Wound Image Taken    Wound Care Consulted? No    Attending Nurse:  Juan Delatorre RN     Second RN/Staff Member:  Jacquie Charge Nurse    *Patient has HS and there are multiple papules throughout back and chest/abdomen. These papules are in a healing stage.*

## 2023-05-20 NOTE — PROGRESS NOTES
[facility] - [facility]  General Surgery  Progress Note    Subjective:     History of Present Illness:  Sho Castaneda is a 28 y.o. female with a history of obesity (BMI 47.1), hypothyroidism, ADD, ADHD, and sleep apnea who presents with lower abdominal/pelvic pain which started on Monday. She reports having severe pain on Monday which started without any inciting event. This kept her from being able to sleep. She took some pain medicine and was eventually able to fall asleep for a few hours. When she woke up her pain was improved but still present. Since then she has had persistent pain which has prohibited her from being able to work. Tylenol has not helped with the pain but she has had some relief with advil. Has had associated anorexia and bloating. Also says she has had hematuria. She says this could be her period but would mean she is a week early. Denies dysuria or flank pain. Is having some suprapubic pain but overall is having bilateral lower quadrant pain    Upon arrival she was AF and HDS. Labs with normal WBC and without granulocytosis. UA with RBC and leukocytes but also with squamous epithelium. CT showed a thickened appendix with a 2.3 x 1.9 x 3.3 cm phlegmon vs developing abscess near the tip of the appendix with findings overall concerning for acute appendicitis. Surgery consulted for evaluation.       Post-Op Info:  Procedure(s) (LRB):  APPENDECTOMY, LAPAROSCOPIC (N/A)   Day of Surgery     Interval History: NAEON. HDS. Pain improved this morning. To OR this afternoon.     Medications:  Continuous Infusions:   dextrose 5 % and 0.45 % NaCl with KCl 20 mEq 125 mL/hr at 05/20/23 0034     Scheduled Meds:   enoxparin  40 mg Subcutaneous Daily    FLUoxetine  20 mg Oral Daily    ibuprofen  600 mg Oral Q6H    levothyroxine  50 mcg Oral Before breakfast    piperacillin-tazobactam (ZOSYN) IVPB  4.5 g Intravenous Q8H     PRN Meds:melatonin, ondansetron, oxyCODONE, sodium chloride 0.9%     Review of  patient's allergies indicates:   Allergen Reactions    Benzoyl peroxide Other (See Comments)     Objective:     Vital Signs (Most Recent):  Temp: 98.6 °F (37 °C) (05/20/23 0915)  Pulse: 93 (05/20/23 0915)  Resp: 18 (05/20/23 0915)  BP: (!) 144/77 (05/20/23 0915)  SpO2: 96 % (05/20/23 0915) Vital Signs (24h Range):  Temp:  [98.3 °F (36.8 °C)-99.3 °F (37.4 °C)] 98.6 °F (37 °C)  Pulse:  [] 93  Resp:  [16-20] 18  SpO2:  [93 %-100 %] 96 %  BP: (131-152)/(75-96) 144/77     Weight: (!) 140.6 kg (310 lb)  Body mass index is 45.78 kg/m².    Intake/Output - Last 3 Shifts         05/18 0700  05/19 0659 05/19 0700  05/20 0659 05/20 0700  05/21 0659    I.V. (mL/kg)  476.4 (3.4)     IV Piggyback  100     Total Intake(mL/kg)  576.4 (4.1)     Urine (mL/kg/hr)  100     Total Output  100     Net  +476.4                     Physical Exam  Vitals and nursing note reviewed.   Constitutional:       Appearance: Normal appearance. She is obese.   HENT:      Head: Normocephalic and atraumatic.   Eyes:      Conjunctiva/sclera: Conjunctivae normal.      Pupils: Pupils are equal, round, and reactive to light.   Cardiovascular:      Rate and Rhythm: Normal rate and regular rhythm.      Pulses: Normal pulses.   Pulmonary:      Effort: Pulmonary effort is normal.      Breath sounds: Normal breath sounds.   Abdominal:      General: Abdomen is flat. Bowel sounds are normal.      Palpations: Abdomen is soft.      Tenderness: There is abdominal tenderness.   Musculoskeletal:         General: Normal range of motion.   Skin:     General: Skin is warm and dry.   Neurological:      General: No focal deficit present.      Mental Status: She is alert and oriented to person, place, and time.   Psychiatric:         Behavior: Behavior normal.         Judgment: Judgment normal.        Significant Labs:  I have reviewed all pertinent lab results within the past 24 hours.  CBC:   Recent Labs   Lab 05/20/23  0606   WBC 9.97   RBC 3.73*   HGB 12.9   HCT  40.6      *   MCH 34.6*   MCHC 31.8*     CMP:   Recent Labs   Lab 05/20/23  0606   GLU 90   CALCIUM 9.5   ALBUMIN 2.5*   PROT 7.3      K 3.5   CO2 22*      BUN 6   CREATININE 0.7   ALKPHOS 93   ALT 46*   AST 53*   BILITOT 0.6       Significant Diagnostics:  I have reviewed all pertinent imaging results/findings within the past 24 hours.    Assessment/Plan:     * Acute appendicitis with localized peritonitis  Sho Castaneda is a 28 y.o. female with acute appendicitis and possible abscess formation.     - Patient seen and examined. Labs and imaging reviewed.   - lap appy today  - NPO, diet after surgery   - MM pain meds  - Zosyn  - Home meds  - PRN nausea meds        Julieta Samano MD  General Surgery  [facility]

## 2023-05-21 VITALS
RESPIRATION RATE: 18 BRPM | SYSTOLIC BLOOD PRESSURE: 133 MMHG | DIASTOLIC BLOOD PRESSURE: 88 MMHG | TEMPERATURE: 99 F | WEIGHT: 293 LBS | HEIGHT: 69 IN | BODY MASS INDEX: 43.4 KG/M2 | OXYGEN SATURATION: 96 % | HEART RATE: 82 BPM

## 2023-05-21 LAB
ALBUMIN SERPL BCP-MCNC: 2.5 G/DL (ref 3.5–5.2)
ALP SERPL-CCNC: 101 U/L (ref 55–135)
ALT SERPL W/O P-5'-P-CCNC: 47 U/L (ref 10–44)
ANION GAP SERPL CALC-SCNC: 7 MMOL/L (ref 8–16)
AST SERPL-CCNC: 41 U/L (ref 10–40)
BASOPHILS # BLD AUTO: 0.03 K/UL (ref 0–0.2)
BASOPHILS NFR BLD: 0.2 % (ref 0–1.9)
BILIRUB SERPL-MCNC: 0.6 MG/DL (ref 0.1–1)
BUN SERPL-MCNC: 6 MG/DL (ref 6–20)
CALCIUM SERPL-MCNC: 9.5 MG/DL (ref 8.7–10.5)
CHLORIDE SERPL-SCNC: 104 MMOL/L (ref 95–110)
CO2 SERPL-SCNC: 24 MMOL/L (ref 23–29)
CREAT SERPL-MCNC: 0.7 MG/DL (ref 0.5–1.4)
DIFFERENTIAL METHOD: ABNORMAL
EOSINOPHIL # BLD AUTO: 0 K/UL (ref 0–0.5)
EOSINOPHIL NFR BLD: 0 % (ref 0–8)
ERYTHROCYTE [DISTWIDTH] IN BLOOD BY AUTOMATED COUNT: 11.9 % (ref 11.5–14.5)
EST. GFR  (NO RACE VARIABLE): >60 ML/MIN/1.73 M^2
GLUCOSE SERPL-MCNC: 127 MG/DL (ref 70–110)
HCT VFR BLD AUTO: 41.4 % (ref 37–48.5)
HGB BLD-MCNC: 13.1 G/DL (ref 12–16)
IMM GRANULOCYTES # BLD AUTO: 0.09 K/UL (ref 0–0.04)
IMM GRANULOCYTES NFR BLD AUTO: 0.7 % (ref 0–0.5)
LYMPHOCYTES # BLD AUTO: 1.5 K/UL (ref 1–4.8)
LYMPHOCYTES NFR BLD: 10.8 % (ref 18–48)
MAGNESIUM SERPL-MCNC: 2.3 MG/DL (ref 1.6–2.6)
MCH RBC QN AUTO: 35 PG (ref 27–31)
MCHC RBC AUTO-ENTMCNC: 31.6 G/DL (ref 32–36)
MCV RBC AUTO: 111 FL (ref 82–98)
MONOCYTES # BLD AUTO: 0.6 K/UL (ref 0.3–1)
MONOCYTES NFR BLD: 4.7 % (ref 4–15)
NEUTROPHILS # BLD AUTO: 11.4 K/UL (ref 1.8–7.7)
NEUTROPHILS NFR BLD: 83.6 % (ref 38–73)
NRBC BLD-RTO: 0 /100 WBC
PHOSPHATE SERPL-MCNC: 4 MG/DL (ref 2.7–4.5)
PLATELET # BLD AUTO: 377 K/UL (ref 150–450)
PMV BLD AUTO: 9.9 FL (ref 9.2–12.9)
POTASSIUM SERPL-SCNC: 4.6 MMOL/L (ref 3.5–5.1)
PROT SERPL-MCNC: 7.7 G/DL (ref 6–8.4)
RBC # BLD AUTO: 3.74 M/UL (ref 4–5.4)
SODIUM SERPL-SCNC: 135 MMOL/L (ref 136–145)
WBC # BLD AUTO: 13.59 K/UL (ref 3.9–12.7)

## 2023-05-21 PROCEDURE — 85025 COMPLETE CBC W/AUTO DIFF WBC: CPT | Performed by: STUDENT IN AN ORGANIZED HEALTH CARE EDUCATION/TRAINING PROGRAM

## 2023-05-21 PROCEDURE — 25000003 PHARM REV CODE 250: Performed by: STUDENT IN AN ORGANIZED HEALTH CARE EDUCATION/TRAINING PROGRAM

## 2023-05-21 PROCEDURE — 96366 THER/PROPH/DIAG IV INF ADDON: CPT

## 2023-05-21 PROCEDURE — 94660 CPAP INITIATION&MGMT: CPT

## 2023-05-21 PROCEDURE — 27000221 HC OXYGEN, UP TO 24 HOURS

## 2023-05-21 PROCEDURE — 83735 ASSAY OF MAGNESIUM: CPT | Performed by: STUDENT IN AN ORGANIZED HEALTH CARE EDUCATION/TRAINING PROGRAM

## 2023-05-21 PROCEDURE — 63600175 PHARM REV CODE 636 W HCPCS: Performed by: STUDENT IN AN ORGANIZED HEALTH CARE EDUCATION/TRAINING PROGRAM

## 2023-05-21 PROCEDURE — G0378 HOSPITAL OBSERVATION PER HR: HCPCS

## 2023-05-21 PROCEDURE — 84100 ASSAY OF PHOSPHORUS: CPT | Performed by: STUDENT IN AN ORGANIZED HEALTH CARE EDUCATION/TRAINING PROGRAM

## 2023-05-21 PROCEDURE — 80053 COMPREHEN METABOLIC PANEL: CPT | Performed by: STUDENT IN AN ORGANIZED HEALTH CARE EDUCATION/TRAINING PROGRAM

## 2023-05-21 PROCEDURE — 36415 COLL VENOUS BLD VENIPUNCTURE: CPT | Performed by: STUDENT IN AN ORGANIZED HEALTH CARE EDUCATION/TRAINING PROGRAM

## 2023-05-21 PROCEDURE — 94761 N-INVAS EAR/PLS OXIMETRY MLT: CPT

## 2023-05-21 PROCEDURE — 99900035 HC TECH TIME PER 15 MIN (STAT)

## 2023-05-21 PROCEDURE — 25000003 PHARM REV CODE 250

## 2023-05-21 RX ORDER — AMOXICILLIN AND CLAVULANATE POTASSIUM 875; 125 MG/1; MG/1
1 TABLET, FILM COATED ORAL EVERY 12 HOURS
Qty: 14 TABLET | Refills: 0 | Status: SHIPPED | OUTPATIENT
Start: 2023-05-21 | End: 2023-05-28

## 2023-05-21 RX ORDER — OXYCODONE HYDROCHLORIDE 5 MG/1
5 TABLET ORAL EVERY 4 HOURS PRN
Qty: 15 TABLET | Refills: 0 | Status: SHIPPED | OUTPATIENT
Start: 2023-05-21

## 2023-05-21 RX ADMIN — OXYCODONE HYDROCHLORIDE 10 MG: 10 TABLET ORAL at 08:05

## 2023-05-21 RX ADMIN — PIPERACILLIN AND TAZOBACTAM 4.5 G: 4; .5 INJECTION, POWDER, LYOPHILIZED, FOR SOLUTION INTRAVENOUS; PARENTERAL at 06:05

## 2023-05-21 RX ADMIN — LEVOTHYROXINE SODIUM 50 MCG: 50 TABLET ORAL at 06:05

## 2023-05-21 RX ADMIN — IBUPROFEN 600 MG: 600 TABLET, FILM COATED ORAL at 06:05

## 2023-05-21 RX ADMIN — FLUOXETINE 20 MG: 20 CAPSULE ORAL at 08:05

## 2023-05-21 NOTE — DISCHARGE SUMMARY
[facility]  General Surgery  Discharge Summary      Patient Name: Sho Castaneda  MRN: [id number]  Admission Date: 5/19/2023  Hospital Length of Stay: 0 days  Discharge Date and Time:  05/21/2023 10:30 AM  Attending Physician: Rey Zaldivar MD   Discharging Provider: Sho Chahal MD  Primary Care Provider: Aydee Dexter NP    HPI:   Sho Castaneda is a 28 y.o. female with a history of obesity (BMI 47.1), hypothyroidism, ADD, ADHD, and sleep apnea who presents with lower abdominal/pelvic pain which started on Monday. She reports having severe pain on Monday which started without any inciting event. This kept her from being able to sleep. She took some pain medicine and was eventually able to fall asleep for a few hours. When she woke up her pain was improved but still present. Since then she has had persistent pain which has prohibited her from being able to work. Tylenol has not helped with the pain but she has had some relief with advil. Has had associated anorexia and bloating. Also says she has had hematuria. She says this could be her period but would mean she is a week early. Denies dysuria or flank pain. Is having some suprapubic pain but overall is having bilateral lower quadrant pain    Upon arrival she was AF and HDS. Labs with normal WBC and without granulocytosis. UA with RBC and leukocytes but also with squamous epithelium. CT showed a thickened appendix with a 2.3 x 1.9 x 3.3 cm phlegmon vs developing abscess near the tip of the appendix with findings overall concerning for acute appendicitis. Surgery consulted for evaluation.       Procedure(s) (LRB):  APPENDECTOMY, LAPAROSCOPIC (N/A)      Indwelling Lines/Drains at time of discharge:   Lines/Drains/Airways     Drain  Duration                Closed/Suction Drain 05/20/23 1322 [facility] Other (Comment) 19 Fr. <1 day              Hospital Course: Sho was admitted to the general surgery service for acute appendicitis. She was taken  for lap appendectomy on 5/20 which was notable for perforated appendicitis with a large amount of friable tissue in the RLQ and abscess. Additionally had a moderate amount of bleeding from the appendiceal artery which was able to be controlled. A 19 Fr nancy drain was left in the RLQ. She tolerated the surgery well and was taken to the PACU in good condition. She was started on a CLD and then her diet was advanced as tolerated. Her pain was controlled with oral medication and she was able to ambulate and void spontaneously. She was discharged with her drain and PO abx with a 2 wk follow up appt.       Goals of Care Treatment Preferences:  Code Status: Full Code      Consults:   Consults (From admission, onward)        Status Ordering Provider     Inpatient consult to General surgery  Once        Provider:  (Not yet assigned)    Completed SHYANNE REBOLLAR Diagnostic Studies: N/A    Pending Diagnostic Studies:     Procedure Component Value Units Date/Time    Specimen to Pathology, Surgery General Surgery [[id number]] Collected: 05/20/23 1340    Order Status: Sent Lab Status: In process Updated: 05/20/23 1341    Specimen: Tissue         Final Active Diagnoses:    Diagnosis Date Noted POA    PRINCIPAL PROBLEM:  Acute appendicitis with localized peritonitis [K35.30] 05/20/2023 Yes      Problems Resolved During this Admission:      Discharged Condition: good    Disposition: Home or Self Care    Follow Up:   Follow-up Information     Rey Zaldivar MD Follow up in 2 week(s).    Specialty: General Surgery  Why: For post operative check up  Contact information:  [address] [facility]  [facility] [address] [phone]                       Patient Instructions:      Lifting restrictions   Order Comments: Do not lift anything >10 lbs (about a gallon of milk) for 6 weeks     No driving until:   Order Comments: No longer taking narcotic pain meds     Notify your health care provider if you experience  any of the following:  temperature >100.4     Notify your health care provider if you experience any of the following:  persistent nausea and vomiting or diarrhea     Notify your health care provider if you experience any of the following:  severe uncontrolled pain     Notify your health care provider if you experience any of the following:  redness, tenderness, or signs of infection (pain, swelling, redness, odor or green/yellow discharge around incision site)     Notify your health care provider if you experience any of the following:  difficulty breathing or increased cough     Notify your health care provider if you experience any of the following:  severe persistent headache     Notify your health care provider if you experience any of the following:  worsening rash     Notify your health care provider if you experience any of the following:  persistent dizziness, light-headedness, or visual disturbances     Notify your health care provider if you experience any of the following:  increased confusion or weakness     No dressing needed   Order Comments: Okay to shower. Please do not soak in water such as a bath, hot tub, or pool for 2 weeks  Your incision is covered with surgical glue (dermabond). When showering, allow soapy water to run over the incision and then pat dry. Do not scrub or pick at the glue. It will fall off on its own over the next 1-2 weeks.     Medications:  Reconciled Home Medications:      Medication List      START taking these medications    amoxicillin-clavulanate 875-125mg 875-125 mg per tablet  Commonly known as: AUGMENTIN  Take 1 tablet by mouth every 12 (twelve) hours. for 7 days     oxyCODONE 5 MG immediate release tablet  Commonly known as: ROXICODONE  Take 1 tablet (5 mg total) by mouth every 4 (four) hours as needed for Pain.        CONTINUE taking these medications    dexAMETHasone 1 MG Tab  Commonly known as: DECADRON  Take 1mg by mouth at 11 PM the night before 8 AM labs are  drawn     dextroamphetamine-amphetamine 30 mg Tab  TK 1 T PO  EVERY EVENING     FLUoxetine 20 MG capsule  Take 20 mg by mouth. 1 Capsule Oral Every day     levothyroxine 50 MCG tablet  Commonly known as: SYNTHROID  Take 1 tablet (50 mcg total) by mouth before breakfast.     VYVANSE 60 MG capsule  Generic drug: lisdexamfetamine  Take 60 mg by mouth every morning.          Time spent on the discharge of patient: 5 minutes    Sho Chahal MD  General Surgery  [facility]

## 2023-05-21 NOTE — PROGRESS NOTES
[facility]  General Surgery  Progress Note    Subjective:     History of Present Illness:  Sho Castaneda is a 28 y.o. female with a history of obesity (BMI 47.1), hypothyroidism, ADD, ADHD, and sleep apnea who presents with lower abdominal/pelvic pain which started on Monday. She reports having severe pain on Monday which started without any inciting event. This kept her from being able to sleep. She took some pain medicine and was eventually able to fall asleep for a few hours. When she woke up her pain was improved but still present. Since then she has had persistent pain which has prohibited her from being able to work. Tylenol has not helped with the pain but she has had some relief with advil. Has had associated anorexia and bloating. Also says she has had hematuria. She says this could be her period but would mean she is a week early. Denies dysuria or flank pain. Is having some suprapubic pain but overall is having bilateral lower quadrant pain    Upon arrival she was AF and HDS. Labs with normal WBC and without granulocytosis. UA with RBC and leukocytes but also with squamous epithelium. CT showed a thickened appendix with a 2.3 x 1.9 x 3.3 cm phlegmon vs developing abscess near the tip of the appendix with findings overall concerning for acute appendicitis. Surgery consulted for evaluation.       Post-Op Info:  Procedure(s) (LRB):  APPENDECTOMY, LAPAROSCOPIC (N/A)   1 Day Post-Op     Interval History: No major events. POD 1 from lap appy with dayami necrosis of the appendix. Tolerated minimal amount of CLD. Pain controlled.    Medications:  Continuous Infusions:  Scheduled Meds:   enoxparin  40 mg Subcutaneous Daily    FLUoxetine  20 mg Oral Daily    ibuprofen  600 mg Oral Q6H    levothyroxine  50 mcg Oral Before breakfast    piperacillin-tazobactam (ZOSYN) IVPB  4.5 g Intravenous Q8H     PRN Meds:melatonin, ondansetron, oxyCODONE, oxyCODONE, sodium chloride 0.9%     Review of patient's  allergies indicates:   Allergen Reactions    Benzoyl peroxide Other (See Comments)     Objective:     Vital Signs (Most Recent):  Temp: 98.8 °F (37.1 °C) (05/21/23 0839)  Pulse: 82 (05/21/23 0839)  Resp: 18 (05/21/23 0841)  BP: 133/88 (05/21/23 0839)  SpO2: 96 % (05/21/23 0839) Vital Signs (24h Range):  Temp:  [97.3 °F (36.3 °C)-98.9 °F (37.2 °C)] 98.8 °F (37.1 °C)  Pulse:  [] 82  Resp:  [10-18] 18  SpO2:  [93 %-98 %] 96 %  BP: (130-181)/() 133/88     Weight: (!) 140.6 kg (310 lb)  Body mass index is 45.78 kg/m².    Intake/Output - Last 3 Shifts         05/19 0700  05/20 0659 05/20 0700 05/21 0659 05/21 0700  05/22 0659    I.V. (mL/kg) 476.4 (3.4)      IV Piggyback 100 800     Total Intake(mL/kg) 576.4 (4.1) 800 (5.7)     Urine (mL/kg/hr) 100 575 (0.2)     Drains  30 30    Stool  0 0    Total Output 100 605 30    Net +476.4 +195 -30           Stool Occurrence  0 x 0 x             Physical Exam  Vitals and nursing note reviewed.   Constitutional:       Appearance: Normal appearance. She is obese.   HENT:      Head: Normocephalic and atraumatic.   Eyes:      Conjunctiva/sclera: Conjunctivae normal.      Pupils: Pupils are equal, round, and reactive to light.   Cardiovascular:      Rate and Rhythm: Normal rate and regular rhythm.      Pulses: Normal pulses.   Pulmonary:      Effort: Pulmonary effort is normal.      Breath sounds: Normal breath sounds.   Abdominal:      General: Abdomen is flat. Bowel sounds are normal.      Palpations: Abdomen is soft.      Tenderness: There is abdominal tenderness.      Comments: Incisions CDI  Drain in place with SS output.    Musculoskeletal:         General: Normal range of motion.   Skin:     General: Skin is warm and dry.   Neurological:      General: No focal deficit present.      Mental Status: She is alert and oriented to person, place, and time.   Psychiatric:         Behavior: Behavior normal.         Judgment: Judgment normal.        Significant Labs:  I have  reviewed all pertinent lab results within the past 24 hours.  CBC:   Recent Labs   Lab 05/21/23  0244   WBC 13.59*   RBC 3.74*   HGB 13.1   HCT 41.4      *   MCH 35.0*   MCHC 31.6*     CMP:   Recent Labs   Lab 05/21/23  0244   *   CALCIUM 9.5   ALBUMIN 2.5*   PROT 7.7   *   K 4.6   CO2 24      BUN 6   CREATININE 0.7   ALKPHOS 101   ALT 47*   AST 41*   BILITOT 0.6       Significant Diagnostics:  I have reviewed all pertinent imaging results/findings within the past 24 hours.    Assessment/Plan:     * Acute appendicitis with localized peritonitis  Sho Castaneda is a 28 y.o. female with acute appendicitis and possible abscess formation.     - Regular diet  - MM pain meds  - Continue Zosyn  - Monitor drain output  - Home meds  - PRN nausea meds  - PPx Lovenox        Jese Lopez MD  General Surgery  [facility]

## 2023-05-21 NOTE — PLAN OF CARE
[facility]  Discharge Final Note    Primary Care Provider: Aydee Dexter NP    Expected Discharge Date: 5/21/2023    Patient is ready to discharge from case management standpoint.    Final Discharge Note (most recent)       Final Note - 05/21/23 1045          Final Note    Assessment Type Final Discharge Note     Anticipated Discharge Disposition Home or Self Care     What phone number can be called within the next 1-3 days to see how you are doing after discharge? [phone]     Hospital Resources/Appts/Education Provided Provided patient/caregiver with written discharge plan information;Post-Acute resouces added to AVS   CHW will arrange post-op with Dr. Zaldivar and call patient with details.       Post-Acute Status    Discharge Delays None known at this time                   Important Message from Medicare         Contact Info       Rey Zaldivar MD   Specialty: General Surgery    [address]  [facility] [address]   Phone: [phone]       Next Steps: Follow up in 2 week(s)    Instructions: For post operative check up          Zuleyma Mathis RN  Weekend  - [facility] [place]  Spectralink: [phone]

## 2023-05-21 NOTE — SUBJECTIVE & OBJECTIVE
Interval History: No major events. POD 1 from lap appy with dayami necrosis of the appendix. Tolerated minimal amount of CLD. Pain controlled.    Medications:  Continuous Infusions:  Scheduled Meds:   enoxparin  40 mg Subcutaneous Daily    FLUoxetine  20 mg Oral Daily    ibuprofen  600 mg Oral Q6H    levothyroxine  50 mcg Oral Before breakfast    piperacillin-tazobactam (ZOSYN) IVPB  4.5 g Intravenous Q8H     PRN Meds:melatonin, ondansetron, oxyCODONE, oxyCODONE, sodium chloride 0.9%     Review of patient's allergies indicates:   Allergen Reactions    Benzoyl peroxide Other (See Comments)     Objective:     Vital Signs (Most Recent):  Temp: 98.8 °F (37.1 °C) (05/21/23 0839)  Pulse: 82 (05/21/23 0839)  Resp: 18 (05/21/23 0841)  BP: 133/88 (05/21/23 0839)  SpO2: 96 % (05/21/23 0839) Vital Signs (24h Range):  Temp:  [97.3 °F (36.3 °C)-98.9 °F (37.2 °C)] 98.8 °F (37.1 °C)  Pulse:  [] 82  Resp:  [10-18] 18  SpO2:  [93 %-98 %] 96 %  BP: (130-181)/() 133/88     Weight: (!) 140.6 kg (310 lb)  Body mass index is 45.78 kg/m².    Intake/Output - Last 3 Shifts         05/19 0700  05/20 0659 05/20 0700  05/21 0659 05/21 0700  05/22 0659    I.V. (mL/kg) 476.4 (3.4)      IV Piggyback 100 800     Total Intake(mL/kg) 576.4 (4.1) 800 (5.7)     Urine (mL/kg/hr) 100 575 (0.2)     Drains  30 30    Stool  0 0    Total Output 100 605 30    Net +476.4 +195 -30           Stool Occurrence  0 x 0 x             Physical Exam  Vitals and nursing note reviewed.   Constitutional:       Appearance: Normal appearance. She is obese.   HENT:      Head: Normocephalic and atraumatic.   Eyes:      Conjunctiva/sclera: Conjunctivae normal.      Pupils: Pupils are equal, round, and reactive to light.   Cardiovascular:      Rate and Rhythm: Normal rate and regular rhythm.      Pulses: Normal pulses.   Pulmonary:      Effort: Pulmonary effort is normal.      Breath sounds: Normal breath sounds.   Abdominal:      General: Abdomen is flat. Bowel  sounds are normal.      Palpations: Abdomen is soft.      Tenderness: There is abdominal tenderness.      Comments: Incisions CDI  Drain in place with SS output.    Musculoskeletal:         General: Normal range of motion.   Skin:     General: Skin is warm and dry.   Neurological:      General: No focal deficit present.      Mental Status: She is alert and oriented to person, place, and time.   Psychiatric:         Behavior: Behavior normal.         Judgment: Judgment normal.        Significant Labs:  I have reviewed all pertinent lab results within the past 24 hours.  CBC:   Recent Labs   Lab 05/21/23 0244   WBC 13.59*   RBC 3.74*   HGB 13.1   HCT 41.4      *   MCH 35.0*   MCHC 31.6*     CMP:   Recent Labs   Lab 05/21/23 0244   *   CALCIUM 9.5   ALBUMIN 2.5*   PROT 7.7   *   K 4.6   CO2 24      BUN 6   CREATININE 0.7   ALKPHOS 101   ALT 47*   AST 41*   BILITOT 0.6       Significant Diagnostics:  I have reviewed all pertinent imaging results/findings within the past 24 hours.

## 2023-05-21 NOTE — NURSING
[facility] Plan of Care Note  Dx: Acute Appendicitis     Shift Events: None     Goals of Care: Pain Control     Neuro: WDL     Vital Signs: WDL     Respiratory: RA, CPAP HS     Diet: Clear Liquid     Is patient tolerating current diet? Yes     GTTS: NA     Urine Output/Bowel Movement: see flow sheets     Drains/Tubes/Tube Feeds (include total output/shift):      Lines: 20 gauge L AC     Accuchecks:NA     Skin: WDL-ex abdominal incision, Lower back impaired skin integrity     Fall Risk Score: 0     Activity level? Independent     Any scheduled procedures? NA     Any safety concerns? None     Other:      No shift events. Pt up in bed wheels locked, bed in lowest position, no distress. No c/o nausea during shift. Pt asleep with CPAP machine at bedside. Will continue to monitor.

## 2023-05-21 NOTE — PROGRESS NOTES
All IV's removed, AVS reviewed, transport called, pt safely dressed. Family at bedside for assistance.

## 2023-05-21 NOTE — OP NOTE
[facility]  General Surgery  Operative Note    DATE: 5/20/2023    PREOPERATIVE DIAGNOSIS: Appendicitis, with perforation and gangrene  Morbid obesity (BMI 45.78)    POSTOPERATIVE DIAGNOSIS: Appendicitis, with perforation and gangrene  Morbid obesity (BMI 45.78)     Procedure(s):  APPENDECTOMY, LAPAROSCOPIC   Modifier 22 - the difficulty and effort required for this procedure was substantially greater than typically required    Surgeon(s) and Role:     * Rey Zaldivar MD - Primary     * Cristal Dixon MD    ANESTHESIA: General endotracheal anesthesia    FINDINGS:  Perforated appendicitis with a large amount of friable tissue in the right lower quadrant.  Abscess cavity identified and drained.  Moderate amount of bleeding from the appendiceal artery which was controlled with endoscopic clips.  19Fr Vignesh drain placed in the right lower quadrant.    INDICATION: Ms. Castaneda is a 28 y.o. female who presented with a five day history of lower pelvic and right lower quadrant pain CT exam was obtained which revealed acute appendicitis with phlegmon and perforation of the area.  Due to the early nature of perforation, the decision was made to go for a laparoscopic appendectomy with drainage of the pelvic abscess.    PROCEDURE IN DETAIL: Patient was brought to the operating room where she was placed in supine position on the operating room table and underwent general anesthesia with endotracheal intubation without complication. The field was sterilely prepped out and draped in standard fashion, and a timeout identifying the correct patient, placement, procedure, and preoperative antibiotics was called with everyone in agreement.  Local anesthetic was injected in the supraumbilical space and a 10 scalpel was used to make a transverse incision through the supraumbilical region.  Electrocautery was used to achieve hemostasis and blunt dissection was performed with two S retractors.  Kocher clamp was used to  elevate the umbilical stalk and the fascia was entered with a 15. Blade scalpel in standard fashion.  A 12 mm Adkins trocar was placed in the supraumbilical space in the abdomen was insufflated to 15 mmHg without any complications.  The abdomen was inspected and a large amount of friable erythematous tissue was noted in the right lower quadrant.  Two additional working trocars were placed one in the suprapubic region and one in the left lower quadrant under direct visualization.  We then inspected and identified the cecum.  The small bowel was mobilized an abscess cavity was identified.  Irrigation and suction were used to remove the large amount of purulent material that was encountered.  The cavity was then broken up with atraumatic graspers and the appendix was able to be identified.  The appendix was extremely friable with a perforation at its base and was free-floating in the right lower quadrant.  The tinea were traced down to the base of the cecum but no identifiable serosal defect was able to be found.  A large amount of pressure was put on the cecum but again no identifiable viable serosal defect was noted to repair.  The appendix was then elevated and a linear DIAMOND stapler with a white load was used to transect the mesoappendix and freed from the surrounding tissue.  Of the third the stapler was fired postop bleeding from the appendiceal artery was noted.  A [place] grasper was used to grab the artery and aid with hemostasis and an endoscopic clip applier was used to place clips over the artery for definitive hemostasis.  The clot and remainder of purulent material were then aspirated and the appendix was retrieved with a 10 mm Endo-Catch bag.  The right lower quadrant was then inspected and no other abnormalities were noted apart from the large amount of friable tissue and matted omentum.  Due to the dirty nature of the procedure and purulent material in the right lower quadrant in addition to not being  able to identify the base of the appendix due to the large amount of friable the inflamed tissue, a 19 Lithuanian Nancy drain was placed in the right lower quadrant and tunneled out the left lower quadrant trocar site.  It was q.h.s. skin with a 2-0 nylon stitch.  The trocars were removed in standard fashion.  The midline fascia was approximated with an 0 Vicryl on a UR6 needle and the skin of the two remaining trocar sites reapproximated with Monocryl and Dermabond.  This completed the proposed operation. All instruments, needles, and sponge counts were reported as correct x2 by the nursing staff. Patient was extubated and awakened from general anesthesia without complication. She was sent to the recovery unit in stable condition.     EBL: 100mL.    COMPLICATIONS: none apparent.    SPECIMEN: appendix for permanent    DRAINS: LLQ 19f nancy drain    DISPOSITION: PACU.    ATTESTATION:  I was present and scrubbed for the entire procedure including all critical portions of the procedure.    Rey Zaldivar MD  Acute Care Surgery and Surgical Critical Care  [facility]  5/20/2023

## 2023-05-21 NOTE — ASSESSMENT & PLAN NOTE
Sho Castaneda is a 28 y.o. female with acute appendicitis and possible abscess formation.     - Regular diet  - MM pain meds  - Continue Zosyn  - Monitor drain output  - Home meds  - PRN nausea meds  - PPx Lovenox

## 2023-05-21 NOTE — HOSPITAL COURSE
Sho was admitted to the general surgery service for acute appendicitis. She was taken for lap appendectomy on 5/20 which was notable for perforated appendicitis with a large amount of friable tissue in the RLQ and abscess. Additionally had a moderate amount of bleeding from the appendiceal artery which was able to be controlled. A 19 Fr nancy drain was left in the RLQ. She tolerated the surgery well and was taken to the PACU in good condition. She was started on a CLD and then her diet was advanced as tolerated. Her pain was controlled with oral medication and she was able to ambulate and void spontaneously. She was discharged with her drain and PO abx with a 2 wk follow up appt.

## 2023-05-22 ENCOUNTER — PATIENT MESSAGE (OUTPATIENT)
Dept: SURGERY | Facility: CLINIC | Age: 28
End: 2023-05-22
Payer: MEDICAID

## 2023-05-25 ENCOUNTER — TELEPHONE (OUTPATIENT)
Dept: SURGERY | Facility: CLINIC | Age: 28
End: 2023-05-25
Payer: MEDICAID

## 2023-05-26 ENCOUNTER — PATIENT MESSAGE (OUTPATIENT)
Dept: INTERNAL MEDICINE | Facility: CLINIC | Age: 28
End: 2023-05-26
Payer: MEDICAID

## 2023-05-26 LAB
FINAL PATHOLOGIC DIAGNOSIS: NORMAL
GROSS: NORMAL
Lab: NORMAL

## 2023-05-26 NOTE — TELEPHONE ENCOUNTER
Pt states she did go to ER on Friday, diagnosed with ruptured appendix.   Pt had CT scan done and noticed abnormalities in results. Would like a review of results.

## 2023-06-02 ENCOUNTER — OFFICE VISIT (OUTPATIENT)
Dept: SURGERY | Facility: CLINIC | Age: 28
End: 2023-06-02
Payer: MEDICAID

## 2023-06-02 VITALS
HEIGHT: 69 IN | SYSTOLIC BLOOD PRESSURE: 143 MMHG | BODY MASS INDEX: 43.4 KG/M2 | DIASTOLIC BLOOD PRESSURE: 100 MMHG | HEART RATE: 111 BPM | OXYGEN SATURATION: 96 % | WEIGHT: 293 LBS

## 2023-06-02 DIAGNOSIS — Z90.49 S/P LAPAROSCOPIC APPENDECTOMY: Primary | ICD-10-CM

## 2023-06-02 PROCEDURE — 99213 OFFICE O/P EST LOW 20 MIN: CPT | Mod: PBBFAC | Performed by: STUDENT IN AN ORGANIZED HEALTH CARE EDUCATION/TRAINING PROGRAM

## 2023-06-02 PROCEDURE — 99499 NO LOS: ICD-10-PCS | Mod: S$PBB,,, | Performed by: STUDENT IN AN ORGANIZED HEALTH CARE EDUCATION/TRAINING PROGRAM

## 2023-06-02 PROCEDURE — 99999 PR PBB SHADOW E&M-EST. PATIENT-LVL III: ICD-10-PCS | Mod: PBBFAC,,, | Performed by: STUDENT IN AN ORGANIZED HEALTH CARE EDUCATION/TRAINING PROGRAM

## 2023-06-02 PROCEDURE — 99999 PR PBB SHADOW E&M-EST. PATIENT-LVL III: CPT | Mod: PBBFAC,,, | Performed by: STUDENT IN AN ORGANIZED HEALTH CARE EDUCATION/TRAINING PROGRAM

## 2023-06-02 PROCEDURE — 99499 UNLISTED E&M SERVICE: CPT | Mod: S$PBB,,, | Performed by: STUDENT IN AN ORGANIZED HEALTH CARE EDUCATION/TRAINING PROGRAM

## 2023-06-02 NOTE — PROGRESS NOTES
[facility]  General Surgery      Subjective:      Sho Castaneda is a 28 y.o. year old female who presents today s/p lap appendectomy on 5/21/23. She has done well at home. Still has drain in place through LLQ port site, only 10 cc of serous output daily. She had diarrhea for the first 3 days postop but otherwise no changes in bowel habits. Notes mild discomfort at lap sites but pain is well-controlled. Tolerating a regular diet without nausea or vomiting. Patient states tachycardia     Vitals:    06/02/23 0958   BP: (!) 143/100   Pulse: (!) 111        Patient Active Problem List   Diagnosis    Hashimoto's disease    Hypothyroidism    Depression    Attention and concentration deficit    RODRIGUE on CPAP    Severe obesity (BMI >= 40)    Acute appendicitis with localized peritonitis       Current Outpatient Medications   Medication Sig Dispense Refill    dexAMETHasone (DECADRON) 1 MG Tab Take 1mg by mouth at 11 PM the night before 8 AM labs are drawn 1 tablet 1    dextroamphetamine-amphetamine 30 mg Tab TK 1 T PO  EVERY EVENING  0    FLUoxetine 20 MG capsule Take 20 mg by mouth. 1 Capsule Oral Every day      levothyroxine (SYNTHROID) 50 MCG tablet Take 1 tablet (50 mcg total) by mouth before breakfast. 90 tablet 3    VYVANSE 60 mg capsule Take 60 mg by mouth every morning.      oxyCODONE (ROXICODONE) 5 MG immediate release tablet Take 1 tablet (5 mg total) by mouth every 4 (four) hours as needed for Pain. (Patient not taking: Reported on 6/2/2023) 15 tablet 0     No current facility-administered medications for this visit.         Objective:     Physical Exam:  General: NAD  HEENT: normocephalic, atraumatic, no scleral icterus or conjunctival injection  Neuro: AAOx3  Cardio: RRR  Resp: no respiratory distress, unlabored breathing  Abd: Soft, non-distended, non-tender; LLQ drain with minimal serous fluid  Ext: Warm and well perfused  Skin: dry, no pallor  Psych: appropriate mood and  behavior    Pathology  1. Appendix, appendectomy:   - Acute appendicitis and periappendicitis     Assessment:       28 y.o. female s/p lap appendectomy on 5/21/23. Doing well.     Plan:     - Drain removed in clinic today  - F/u prn   - Please call with questions or concerns      Jimy Mckoy MD  Surgery Resident, PGY-4  Pager: [phone]  6/2/2023 10:06 AM

## 2023-07-03 ENCOUNTER — PATIENT MESSAGE (OUTPATIENT)
Dept: SURGERY | Facility: CLINIC | Age: 28
End: 2023-07-03
Payer: MEDICAID

## 2023-07-03 ENCOUNTER — PATIENT MESSAGE (OUTPATIENT)
Dept: INTERNAL MEDICINE | Facility: CLINIC | Age: 28
End: 2023-07-03
Payer: MEDICAID

## 2023-07-03 RX ORDER — ALBUTEROL SULFATE 90 UG/1
2 AEROSOL, METERED RESPIRATORY (INHALATION) EVERY 6 HOURS PRN
Qty: 18 G | Refills: 0 | Status: SHIPPED | OUTPATIENT
Start: 2023-07-03

## 2023-07-03 NOTE — TELEPHONE ENCOUNTER
Lov 23/05/19      No Rtc Appointment       [facility] 14/10/24, 17/05/30, 14/10/24, 14/07/19, 15/12/28, 14/10/24      Rx : rescue inhaler and nebulizer solution

## 2023-07-11 ENCOUNTER — PATIENT MESSAGE (OUTPATIENT)
Dept: ADMINISTRATIVE | Facility: OTHER | Age: 28
End: 2023-07-11
Payer: MEDICAID

## 2023-08-29 ENCOUNTER — PATIENT MESSAGE (OUTPATIENT)
Dept: SLEEP MEDICINE | Facility: CLINIC | Age: 28
End: 2023-08-29
Payer: MEDICAID

## (undated) DEVICE — ELECTRODE REM PLYHSV RETURN 9

## (undated) DEVICE — DRAPE ABDOMINAL TIBURON 14X11

## (undated) DEVICE — SOL IRR NACL .9% 3000ML

## (undated) DEVICE — TRAY MINOR GEN SURG OMC

## (undated) DEVICE — CART STAPLE RELD 45MM WHT

## (undated) DEVICE — SUT 0 VICRYL / UR6 (J603)

## (undated) DEVICE — DRESSING TEGADERM 2 3/8 X 2.75

## (undated) DEVICE — TUBING HF INSUFFLATION W/ FLTR

## (undated) DEVICE — SOL NS 1000CC

## (undated) DEVICE — KIT ANTIFOG W/SPONG & FLUID

## (undated) DEVICE — TROCAR ENDOPATH XCEL 5MM 7.5CM

## (undated) DEVICE — ADHESIVE DERMABOND ADVANCED

## (undated) DEVICE — EVACUATOR WOUND BULB 100CC

## (undated) DEVICE — CART STAPLE FLEX ETX 3.5MM BLU

## (undated) DEVICE — SPONGE GAUZE 16PLY 4X4